# Patient Record
Sex: MALE | Race: WHITE | NOT HISPANIC OR LATINO | URBAN - METROPOLITAN AREA
[De-identification: names, ages, dates, MRNs, and addresses within clinical notes are randomized per-mention and may not be internally consistent; named-entity substitution may affect disease eponyms.]

---

## 2020-10-23 ENCOUNTER — OUTPATIENT (OUTPATIENT)
Dept: OUTPATIENT SERVICES | Facility: HOSPITAL | Age: 70
LOS: 1 days | Discharge: HOME | End: 2020-10-23
Payer: MEDICARE

## 2020-10-23 ENCOUNTER — APPOINTMENT (OUTPATIENT)
Dept: WOUND CARE | Facility: HOSPITAL | Age: 70
End: 2020-10-23
Payer: MEDICARE

## 2020-10-23 VITALS
WEIGHT: 185 LBS | TEMPERATURE: 97.1 F | HEIGHT: 68 IN | DIASTOLIC BLOOD PRESSURE: 91 MMHG | RESPIRATION RATE: 20 BRPM | SYSTOLIC BLOOD PRESSURE: 149 MMHG | HEART RATE: 118 BPM | BODY MASS INDEX: 28.04 KG/M2 | OXYGEN SATURATION: 96 %

## 2020-10-23 DIAGNOSIS — Z82.49 FAMILY HISTORY OF ISCHEMIC HEART DISEASE AND OTHER DISEASES OF THE CIRCULATORY SYSTEM: ICD-10-CM

## 2020-10-23 DIAGNOSIS — Z87.828 PERSONAL HISTORY OF OTHER (HEALED) PHYSICAL INJURY AND TRAUMA: ICD-10-CM

## 2020-10-23 DIAGNOSIS — L97.301 NON-PRESSURE CHRONIC ULCER OF UNSPECIFIED ANKLE LIMITED TO BREAKDOWN OF SKIN: ICD-10-CM

## 2020-10-23 DIAGNOSIS — Z80.0 FAMILY HISTORY OF MALIGNANT NEOPLASM OF DIGESTIVE ORGANS: ICD-10-CM

## 2020-10-23 PROCEDURE — 99203 OFFICE O/P NEW LOW 30 MIN: CPT

## 2020-10-23 PROCEDURE — G0463: CPT

## 2020-10-23 RX ORDER — OXYBUTYNIN CHLORIDE 15 MG/1
15 TABLET, EXTENDED RELEASE ORAL DAILY
Refills: 0 | Status: ACTIVE | COMMUNITY

## 2020-10-23 RX ORDER — OXYCODONE HYDROCHLORIDE 10 MG/1
10 TABLET, FILM COATED, EXTENDED RELEASE ORAL TWICE DAILY
Refills: 0 | Status: ACTIVE | COMMUNITY

## 2020-10-23 RX ORDER — OXYCODONE 5 MG/1
5 TABLET ORAL DAILY
Refills: 0 | Status: ACTIVE | COMMUNITY

## 2020-10-23 NOTE — PLAN
[FreeTextEntry1] : Patient examined and evaluated at this time.\par Continue local wound care and offloading.\par Radiographs ordered.\par Will auth for mri.\par Will auth for vascular studies.\par Pt to follow up in 1 week.

## 2020-10-23 NOTE — PHYSICAL EXAM
[1+] : left 1+ [Ankle Swelling (On Exam)] : not present [Varicose Veins Of Lower Extremities] : not present [] : not present [Skin Ulcer] : ulcer [Calm] : calm [de-identified] : A&Ox3, NAD [de-identified] : 1/5 strength in all quadrants bilaterally [de-identified] : right medial malleolar ankle ulcer down to skin, subcutaneous tissue, fat [FreeTextEntry1] : Right Medial Ankle [FreeTextEntry2] : 0.5 [FreeTextEntry3] : 0.8 [FreeTextEntry4] : 0.4 [de-identified] : Small Serosanguineous [de-identified] : Intact [de-identified] : Silver Alginate/ Allevyn Border [de-identified] : Cleansed with Normal saline\par  [de-identified] : CIRCULATION\par Dorsalis Pedis: R palpable  L palpable\par Posterior Tibialis: R Doppler L Doppler\par Extremity Color: Pigmented\par Extremity Temperature: Warm\par Capillary Refill: < 3 seconds bilaterally\par Vascular studies ordered by Dr Nicolas wood sheet submitted\par \par \par  [de-identified] : None [de-identified] : None [de-identified] : 100% [de-identified] : No

## 2020-10-23 NOTE — HISTORY OF PRESENT ILLNESS
[FreeTextEntry1] : The wound is located on Right Medial Ankle- pt states the wound developed about 2.5 years ago (pressure ulcer)- pt was managed by his Podiatrist (Dr [place]) with different topical creams- the wound has scabbed on & off but never closed- pt states he underwent xrays & MRI in 08/2018 (-OM) but no further diagnostic tests since then

## 2020-10-23 NOTE — REVIEW OF SYSTEMS
[Fever] : no fever [Eye Pain] : no eye pain [Earache] : no earache [Chest Pain] : no chest pain [Shortness Of Breath] : no shortness of breath [Cough] : no cough [Abdominal Pain] : no abdominal pain [Skin Wound] : skin wound [FreeTextEntry9] : paraplegia [de-identified] : right ankle ulcer down to skin, subcutaneous tissue, fat

## 2020-10-23 NOTE — ASSESSMENT
[Verbal] : Verbal [Demo] : Demo [Patient] : Patient [Spouse] : Spouse [Good - alert, interested, motivated] : Good - alert, interested, motivated [Verbalizes knowledge/Understanding] : Verbalizes knowledge/understanding [Dressing changes] : dressing changes [Foot Care] : foot care [Skin Care] : skin care [Pressure relief] : pressure relief [Signs and symptoms of infection] : sign and symptoms of infection [How and When to Call] : how and when to call [Labs and Tests] : labs and tests [Off-loading] : off-loading [Patient responsibility to plan of care] : patient responsibility to plan of care [] : Yes [Stable] : stable [Home] : Home [Wheelchair] : Wheelchair [FreeTextEntry2] : Alteration in skin integrity- promote optimal skin integrity\par  [FreeTextEntry4] : Dr Hercules/ Photos taken\par Vascular studies ordered by - [place] sheet submitted\par Xrays & MRI ordered by Dr Hercules- Pt & Pt's Wife would prefer pt to have xrays & MRI at [facility]-  to bring results of both to next visit to [facility]\par Biopsy ordered by Dr Hercules for next visit to [facility]- [place] sheet submitted\par F/U to [facility] in 1 week

## 2020-10-25 DIAGNOSIS — Z87.81 PERSONAL HISTORY OF (HEALED) TRAUMATIC FRACTURE: ICD-10-CM

## 2020-10-25 DIAGNOSIS — G82.20 PARAPLEGIA, UNSPECIFIED: ICD-10-CM

## 2020-10-25 DIAGNOSIS — Z87.828 PERSONAL HISTORY OF OTHER (HEALED) PHYSICAL INJURY AND TRAUMA: ICD-10-CM

## 2020-10-25 DIAGNOSIS — R26.9 UNSPECIFIED ABNORMALITIES OF GAIT AND MOBILITY: ICD-10-CM

## 2020-10-25 DIAGNOSIS — Z79.899 OTHER LONG TERM (CURRENT) DRUG THERAPY: ICD-10-CM

## 2020-10-25 DIAGNOSIS — L89.513 PRESSURE ULCER OF RIGHT ANKLE, STAGE 3: ICD-10-CM

## 2020-10-25 DIAGNOSIS — Z98.1 ARTHRODESIS STATUS: ICD-10-CM

## 2020-10-25 DIAGNOSIS — Z82.49 FAMILY HISTORY OF ISCHEMIC HEART DISEASE AND OTHER DISEASES OF THE CIRCULATORY SYSTEM: ICD-10-CM

## 2020-10-25 DIAGNOSIS — D50.9 IRON DEFICIENCY ANEMIA, UNSPECIFIED: ICD-10-CM

## 2020-10-25 DIAGNOSIS — Z99.3 DEPENDENCE ON WHEELCHAIR: ICD-10-CM

## 2020-10-25 DIAGNOSIS — R33.9 RETENTION OF URINE, UNSPECIFIED: ICD-10-CM

## 2020-10-25 DIAGNOSIS — Z80.0 FAMILY HISTORY OF MALIGNANT NEOPLASM OF DIGESTIVE ORGANS: ICD-10-CM

## 2020-10-30 ENCOUNTER — OUTPATIENT (OUTPATIENT)
Dept: OUTPATIENT SERVICES | Facility: HOSPITAL | Age: 70
LOS: 1 days | End: 2020-10-30
Payer: COMMERCIAL

## 2020-10-30 ENCOUNTER — APPOINTMENT (OUTPATIENT)
Dept: WOUND CARE | Facility: HOSPITAL | Age: 70
End: 2020-10-30
Payer: MEDICARE

## 2020-10-30 ENCOUNTER — RESULT REVIEW (OUTPATIENT)
Age: 70
End: 2020-10-30

## 2020-10-30 ENCOUNTER — OUTPATIENT (OUTPATIENT)
Dept: OUTPATIENT SERVICES | Facility: HOSPITAL | Age: 70
LOS: 1 days | Discharge: HOME | End: 2020-10-30
Payer: MEDICARE

## 2020-10-30 VITALS
SYSTOLIC BLOOD PRESSURE: 155 MMHG | DIASTOLIC BLOOD PRESSURE: 83 MMHG | BODY MASS INDEX: 28.04 KG/M2 | WEIGHT: 185 LBS | RESPIRATION RATE: 18 BRPM | HEART RATE: 107 BPM | OXYGEN SATURATION: 98 % | TEMPERATURE: 97.6 F | HEIGHT: 68 IN

## 2020-10-30 DIAGNOSIS — L97.301 NON-PRESSURE CHRONIC ULCER OF UNSPECIFIED ANKLE LIMITED TO BREAKDOWN OF SKIN: ICD-10-CM

## 2020-10-30 DIAGNOSIS — L89.513 PRESSURE ULCER OF RIGHT ANKLE, STAGE 3: ICD-10-CM

## 2020-10-30 PROCEDURE — G0463: CPT

## 2020-10-30 PROCEDURE — 99213 OFFICE O/P EST LOW 20 MIN: CPT

## 2020-10-30 PROCEDURE — 93923 UPR/LXTR ART STDY 3+ LVLS: CPT | Mod: 26

## 2020-10-30 PROCEDURE — 93970 EXTREMITY STUDY: CPT

## 2020-10-30 PROCEDURE — 93970 EXTREMITY STUDY: CPT | Mod: 26

## 2020-10-30 PROCEDURE — 93923 UPR/LXTR ART STDY 3+ LVLS: CPT

## 2020-10-31 DIAGNOSIS — Z98.1 ARTHRODESIS STATUS: ICD-10-CM

## 2020-10-31 DIAGNOSIS — Z80.0 FAMILY HISTORY OF MALIGNANT NEOPLASM OF DIGESTIVE ORGANS: ICD-10-CM

## 2020-10-31 DIAGNOSIS — L89.513 PRESSURE ULCER OF RIGHT ANKLE, STAGE 3: ICD-10-CM

## 2020-10-31 DIAGNOSIS — G82.20 PARAPLEGIA, UNSPECIFIED: ICD-10-CM

## 2020-10-31 DIAGNOSIS — Z87.81 PERSONAL HISTORY OF (HEALED) TRAUMATIC FRACTURE: ICD-10-CM

## 2020-10-31 DIAGNOSIS — R26.9 UNSPECIFIED ABNORMALITIES OF GAIT AND MOBILITY: ICD-10-CM

## 2020-10-31 DIAGNOSIS — Z79.899 OTHER LONG TERM (CURRENT) DRUG THERAPY: ICD-10-CM

## 2020-10-31 DIAGNOSIS — D50.9 IRON DEFICIENCY ANEMIA, UNSPECIFIED: ICD-10-CM

## 2020-10-31 DIAGNOSIS — Z99.3 DEPENDENCE ON WHEELCHAIR: ICD-10-CM

## 2020-10-31 DIAGNOSIS — R33.9 RETENTION OF URINE, UNSPECIFIED: ICD-10-CM

## 2020-10-31 DIAGNOSIS — Z87.828 PERSONAL HISTORY OF OTHER (HEALED) PHYSICAL INJURY AND TRAUMA: ICD-10-CM

## 2020-10-31 DIAGNOSIS — Z82.49 FAMILY HISTORY OF ISCHEMIC HEART DISEASE AND OTHER DISEASES OF THE CIRCULATORY SYSTEM: ICD-10-CM

## 2020-11-04 NOTE — REVIEW OF SYSTEMS
[Skin Wound] : skin wound [Fever] : no fever [Eye Pain] : no eye pain [Earache] : no earache [Chest Pain] : no chest pain [Shortness Of Breath] : no shortness of breath [Cough] : no cough [Abdominal Pain] : no abdominal pain [FreeTextEntry9] : paraplegia [de-identified] : right ankle ulcer down to skin, subcutaneous tissue, fat

## 2020-11-04 NOTE — PHYSICAL EXAM
[2 x 2] : 2 x 2  [1+] : left 1+ [Skin Ulcer] : ulcer [Calm] : calm [Ankle Swelling (On Exam)] : not present [Varicose Veins Of Lower Extremities] : not present [] : not present [de-identified] : A&Ox3, NAD [de-identified] : 1/5 strength in all quadrants bilaterally [de-identified] : right medial malleolar ankle ulcer down to skin, subcutaneous tissue, fat [FreeTextEntry1] : Right Medial Ankle [FreeTextEntry2] : 2.6 [FreeTextEntry3] : 4.0 [FreeTextEntry4] : 0.2 [de-identified] : Serosanguineous [de-identified] : Excoriated/ Slightly Macerated [de-identified] : 1-25% [de-identified] : Silver Alginate [de-identified] : Cleansed with Normal Saline\par Cloth Tape [TWNoteComboBox4] : Small [TWNoteComboBox5] : No [de-identified] : No [de-identified] : None [de-identified] : None [de-identified] : >75% [de-identified] : Yes [de-identified] : Daily

## 2020-11-04 NOTE — ASSESSMENT
[Verbal] : Verbal [Patient] : Patient [Good - alert, interested, motivated] : Good - alert, interested, motivated [Verbalizes knowledge/Understanding] : Verbalizes knowledge/understanding [Dressing changes] : dressing changes [Skin Care] : skin care [Signs and symptoms of infection] : sign and symptoms of infection [How and When to Call] : how and when to call [Compression Therapy] : compression therapy [Patient responsibility to plan of care] : patient responsibility to plan of care [Stable] : stable [Home] : Home [Wheelchair] : Wheelchair [Not Applicable - Long Term Care/Home Health Agency] : Long Term Care/Home Health Agency: Not Applicable [] : No [FreeTextEntry2] : Restore Skin Integrity\par Infection Control\par Localized wound care\par Maintain acceptable pain levels at satisfactory relief.\par Demonstrates use of both nonpharmacological and pharmacological pain relief strategies [FreeTextEntry4] : F/U to [facility] in 1 week\par DPM Feels Pt would Benefit From Nushield to Right Medial Ankle, Auth Submitted Pt stated understanding.\par 1 4x4 NuShield ordered on 11/02/20 for pts next assessment

## 2020-11-04 NOTE — HISTORY OF PRESENT ILLNESS
[FreeTextEntry1] : Pt seen forright medial ankle pressure ulcer. Pt and wife relate that they have been dressing the wound as advised. Denies any other complaints at this time. Relates that radiographs and mri was performed.

## 2020-11-04 NOTE — PLAN
[FreeTextEntry1] : Patient examined and evaluated at this time.\par Continue local wound care and offloading.\par Radiographs ordered.\par Will auth for [facility].\par Pt to follow up in 1 week.

## 2020-11-06 ENCOUNTER — APPOINTMENT (OUTPATIENT)
Dept: WOUND CARE | Facility: HOSPITAL | Age: 70
End: 2020-11-06
Payer: MEDICARE

## 2020-11-06 ENCOUNTER — OUTPATIENT (OUTPATIENT)
Dept: OUTPATIENT SERVICES | Facility: HOSPITAL | Age: 70
LOS: 1 days | Discharge: HOME | End: 2020-11-06
Payer: MEDICARE

## 2020-11-06 VITALS
BODY MASS INDEX: 28.04 KG/M2 | RESPIRATION RATE: 18 BRPM | DIASTOLIC BLOOD PRESSURE: 88 MMHG | OXYGEN SATURATION: 20 % | TEMPERATURE: 98.1 F | SYSTOLIC BLOOD PRESSURE: 145 MMHG | HEIGHT: 68 IN | HEART RATE: 108 BPM | WEIGHT: 185 LBS

## 2020-11-06 DIAGNOSIS — Z87.828 PERSONAL HISTORY OF OTHER (HEALED) PHYSICAL INJURY AND TRAUMA: ICD-10-CM

## 2020-11-06 DIAGNOSIS — Z87.81 PERSONAL HISTORY OF (HEALED) TRAUMATIC FRACTURE: ICD-10-CM

## 2020-11-06 DIAGNOSIS — D50.9 IRON DEFICIENCY ANEMIA, UNSPECIFIED: ICD-10-CM

## 2020-11-06 DIAGNOSIS — R26.9 UNSPECIFIED ABNORMALITIES OF GAIT AND MOBILITY: ICD-10-CM

## 2020-11-06 DIAGNOSIS — L97.301 NON-PRESSURE CHRONIC ULCER OF UNSPECIFIED ANKLE LIMITED TO BREAKDOWN OF SKIN: ICD-10-CM

## 2020-11-06 DIAGNOSIS — G82.20 PARAPLEGIA, UNSPECIFIED: ICD-10-CM

## 2020-11-06 DIAGNOSIS — Z80.0 FAMILY HISTORY OF MALIGNANT NEOPLASM OF DIGESTIVE ORGANS: ICD-10-CM

## 2020-11-06 DIAGNOSIS — Z82.49 FAMILY HISTORY OF ISCHEMIC HEART DISEASE AND OTHER DISEASES OF THE CIRCULATORY SYSTEM: ICD-10-CM

## 2020-11-06 DIAGNOSIS — Z98.1 ARTHRODESIS STATUS: ICD-10-CM

## 2020-11-06 DIAGNOSIS — R33.9 RETENTION OF URINE, UNSPECIFIED: ICD-10-CM

## 2020-11-06 DIAGNOSIS — Z79.899 OTHER LONG TERM (CURRENT) DRUG THERAPY: ICD-10-CM

## 2020-11-06 DIAGNOSIS — L89.513 PRESSURE ULCER OF RIGHT ANKLE, STAGE 3: ICD-10-CM

## 2020-11-06 DIAGNOSIS — Z99.3 DEPENDENCE ON WHEELCHAIR: ICD-10-CM

## 2020-11-06 PROCEDURE — 99213 OFFICE O/P EST LOW 20 MIN: CPT

## 2020-11-06 PROCEDURE — G0463: CPT

## 2020-11-06 NOTE — PHYSICAL EXAM
[2 x 2] : 2 x 2  [1+] : left 1+ [Ankle Swelling (On Exam)] : not present [Varicose Veins Of Lower Extremities] : not present [] : not present [Skin Ulcer] : ulcer [Calm] : calm [de-identified] : A&Ox3, NAD [de-identified] : 1/5 strength in all quadrants bilaterally [de-identified] : right medial malleolar ankle ulcer down to skin, subcutaneous tissue, fat [FreeTextEntry1] : Right Medial Ankle [FreeTextEntry2] : 0.5 [FreeTextEntry3] : 0.8 [FreeTextEntry4] : 0.2 [de-identified] : Serosanguineous [de-identified] : 50% [de-identified] : Silver Alginate [de-identified] : Cleansed with Normal Saline\par Cloth Tape [TWNoteComboBox4] : Small [TWNoteComboBox5] : No [de-identified] : No [de-identified] : Normal [de-identified] : None [de-identified] : None [de-identified] : 50% [de-identified] : Yes [de-identified] : Every other day

## 2020-11-06 NOTE — ASSESSMENT
[Verbal] : Verbal [Patient] : Patient [Good - alert, interested, motivated] : Good - alert, interested, motivated [Verbalizes knowledge/Understanding] : Verbalizes knowledge/understanding [Dressing changes] : dressing changes [Skin Care] : skin care [Signs and symptoms of infection] : sign and symptoms of infection [How and When to Call] : how and when to call [Compression Therapy] : compression therapy [Patient responsibility to plan of care] : patient responsibility to plan of care [Stable] : stable [Home] : Home [Wheelchair] : Wheelchair [Not Applicable - Long Term Care/Home Health Agency] : Long Term Care/Home Health Agency: Not Applicable [] : Yes [FreeTextEntry2] : Restore Skin Integrity\par Infection Control\par Localized wound care\par Maintain acceptable pain levels at satisfactory relief.\par Demonstrates use of both nonpharmacological and pharmacological pain relief strategies [FreeTextEntry4] : Photo Taken\par NuShield not applied as per DPM due to small wound size.\par F/U to [facility] in 1 week

## 2020-11-06 NOTE — REVIEW OF SYSTEMS
[Fever] : no fever [Eye Pain] : no eye pain [Earache] : no earache [Chest Pain] : no chest pain [Shortness Of Breath] : no shortness of breath [Cough] : no cough [Abdominal Pain] : no abdominal pain [Skin Wound] : skin wound [FreeTextEntry9] : paraplegia [de-identified] : right ankle ulcer down to skin, subcutaneous tissue, fat

## 2020-11-06 NOTE — REVIEW OF SYSTEMS
[Fever] : no fever [Eye Pain] : no eye pain [Earache] : no earache [Chest Pain] : no chest pain [Shortness Of Breath] : no shortness of breath [Cough] : no cough [Abdominal Pain] : no abdominal pain [Skin Wound] : skin wound [FreeTextEntry9] : paraplegia [de-identified] : right ankle ulcer down to skin, subcutaneous tissue, fat

## 2020-11-06 NOTE — PHYSICAL EXAM
[2 x 2] : 2 x 2  [1+] : left 1+ [Ankle Swelling (On Exam)] : not present [Varicose Veins Of Lower Extremities] : not present [] : not present [Skin Ulcer] : ulcer [Calm] : calm [de-identified] : A&Ox3, NAD [de-identified] : 1/5 strength in all quadrants bilaterally [de-identified] : right medial malleolar ankle ulcer down to skin, subcutaneous tissue, fat [FreeTextEntry1] : Right Medial Ankle [FreeTextEntry2] : 0.5 [FreeTextEntry3] : 0.8 [FreeTextEntry4] : 0.2 [de-identified] : Serosanguineous [de-identified] : 50% [de-identified] : Silver Alginate [de-identified] : Cleansed with Normal Saline\par Cloth Tape [TWNoteComboBox4] : Small [TWNoteComboBox5] : No [de-identified] : No [de-identified] : Normal [de-identified] : None [de-identified] : None [de-identified] : 50% [de-identified] : Yes [de-identified] : Every other day

## 2020-11-06 NOTE — HISTORY OF PRESENT ILLNESS
[FreeTextEntry1] : Pt seen forright medial ankle pressure ulcer. Pt and wife relate that they have been dressing the wound as advised. Denies any other complaints at this time.

## 2020-11-13 ENCOUNTER — OUTPATIENT (OUTPATIENT)
Dept: OUTPATIENT SERVICES | Facility: HOSPITAL | Age: 70
LOS: 1 days | Discharge: HOME | End: 2020-11-13
Payer: MEDICARE

## 2020-11-13 ENCOUNTER — APPOINTMENT (OUTPATIENT)
Dept: WOUND CARE | Facility: HOSPITAL | Age: 70
End: 2020-11-13
Payer: MEDICARE

## 2020-11-13 ENCOUNTER — APPOINTMENT (OUTPATIENT)
Dept: WOUND CARE | Facility: HOSPITAL | Age: 70
End: 2020-11-13

## 2020-11-13 ENCOUNTER — APPOINTMENT (OUTPATIENT)
Dept: SURGERY | Facility: HOSPITAL | Age: 70
End: 2020-11-13

## 2020-11-13 VITALS
OXYGEN SATURATION: 100 % | HEIGHT: 68 IN | RESPIRATION RATE: 20 BRPM | HEART RATE: 120 BPM | DIASTOLIC BLOOD PRESSURE: 84 MMHG | TEMPERATURE: 98.6 F | BODY MASS INDEX: 28.04 KG/M2 | WEIGHT: 185 LBS | SYSTOLIC BLOOD PRESSURE: 146 MMHG

## 2020-11-13 DIAGNOSIS — L97.301 NON-PRESSURE CHRONIC ULCER OF UNSPECIFIED ANKLE LIMITED TO BREAKDOWN OF SKIN: ICD-10-CM

## 2020-11-13 PROCEDURE — 99213 OFFICE O/P EST LOW 20 MIN: CPT

## 2020-11-13 PROCEDURE — G0463: CPT

## 2020-11-14 DIAGNOSIS — R26.9 UNSPECIFIED ABNORMALITIES OF GAIT AND MOBILITY: ICD-10-CM

## 2020-11-14 DIAGNOSIS — Z98.1 ARTHRODESIS STATUS: ICD-10-CM

## 2020-11-14 DIAGNOSIS — D50.9 IRON DEFICIENCY ANEMIA, UNSPECIFIED: ICD-10-CM

## 2020-11-14 DIAGNOSIS — Z79.899 OTHER LONG TERM (CURRENT) DRUG THERAPY: ICD-10-CM

## 2020-11-14 DIAGNOSIS — L89.513 PRESSURE ULCER OF RIGHT ANKLE, STAGE 3: ICD-10-CM

## 2020-11-14 DIAGNOSIS — Z87.828 PERSONAL HISTORY OF OTHER (HEALED) PHYSICAL INJURY AND TRAUMA: ICD-10-CM

## 2020-11-14 DIAGNOSIS — G82.20 PARAPLEGIA, UNSPECIFIED: ICD-10-CM

## 2020-11-14 DIAGNOSIS — Z87.81 PERSONAL HISTORY OF (HEALED) TRAUMATIC FRACTURE: ICD-10-CM

## 2020-11-14 DIAGNOSIS — R33.9 RETENTION OF URINE, UNSPECIFIED: ICD-10-CM

## 2020-11-14 DIAGNOSIS — Z99.3 DEPENDENCE ON WHEELCHAIR: ICD-10-CM

## 2020-11-14 DIAGNOSIS — Z80.0 FAMILY HISTORY OF MALIGNANT NEOPLASM OF DIGESTIVE ORGANS: ICD-10-CM

## 2020-11-14 DIAGNOSIS — Z82.49 FAMILY HISTORY OF ISCHEMIC HEART DISEASE AND OTHER DISEASES OF THE CIRCULATORY SYSTEM: ICD-10-CM

## 2020-11-14 NOTE — PHYSICAL EXAM
[1+] : left 1+ [Ankle Swelling (On Exam)] : not present [Varicose Veins Of Lower Extremities] : not present [] : not present [Skin Ulcer] : ulcer [Calm] : calm [de-identified] : A&Ox3, NAD [de-identified] : 1/5 strength in all quadrants bilaterally [de-identified] : right medial malleolar ankle ulcer down to skin, subcutaneous tissue, fat [FreeTextEntry1] : Right Medial Ankle [FreeTextEntry2] : 0.3 [FreeTextEntry3] : 0.5 [FreeTextEntry4] : 0.1 [de-identified] : Scant Serosanguineous [de-identified] : Intact [de-identified] : Calcium Alginate [de-identified] : Cleansed with Normal Saline\par Cloth Tape [TWNoteComboBox5] : No [de-identified] : No [de-identified] : None [de-identified] : None [de-identified] : 100% [de-identified] : No [de-identified] : Every other day

## 2020-11-14 NOTE — ASSESSMENT
[] : No [FreeTextEntry4] : F/U to [facility] in 1 week [FreeTextEntry2] : Restore Skin Integrity\par Infection Control\par Localized wound care\par Maintain acceptable pain levels at satisfactory relief.\par Demonstrates use of both nonpharmacological and pharmacological pain relief strategies

## 2020-11-14 NOTE — REVIEW OF SYSTEMS
[Fever] : no fever [Eye Pain] : no eye pain [Earache] : no earache [Chest Pain] : no chest pain [Shortness Of Breath] : no shortness of breath [Cough] : no cough [Abdominal Pain] : no abdominal pain [Skin Wound] : skin wound [FreeTextEntry9] : paraplegia [de-identified] : right ankle ulcer down to skin, subcutaneous tissue, fat

## 2020-11-14 NOTE — HISTORY OF PRESENT ILLNESS
[FreeTextEntry1] : Pt seen for right medial ankle pressure ulcer. Pt and wife relate that they have been dressing the wound as advised. Denies any other complaints at this time.

## 2020-11-20 ENCOUNTER — APPOINTMENT (OUTPATIENT)
Dept: SURGERY | Facility: HOSPITAL | Age: 70
End: 2020-11-20
Payer: MEDICARE

## 2020-11-20 ENCOUNTER — APPOINTMENT (OUTPATIENT)
Dept: WOUND CARE | Facility: HOSPITAL | Age: 70
End: 2020-11-20

## 2020-11-20 ENCOUNTER — OUTPATIENT (OUTPATIENT)
Dept: OUTPATIENT SERVICES | Facility: HOSPITAL | Age: 70
LOS: 1 days | Discharge: HOME | End: 2020-11-20
Payer: MEDICARE

## 2020-11-20 VITALS
TEMPERATURE: 97.2 F | BODY MASS INDEX: 28.04 KG/M2 | DIASTOLIC BLOOD PRESSURE: 90 MMHG | WEIGHT: 185 LBS | HEIGHT: 68 IN | SYSTOLIC BLOOD PRESSURE: 139 MMHG | HEART RATE: 114 BPM | OXYGEN SATURATION: 98 % | RESPIRATION RATE: 20 BRPM

## 2020-11-20 DIAGNOSIS — L89.513 PRESSURE ULCER OF RIGHT ANKLE, STAGE 3: ICD-10-CM

## 2020-11-20 DIAGNOSIS — L89.153 PRESSURE ULCER OF SACRAL REGION, STAGE 3: ICD-10-CM

## 2020-11-20 PROCEDURE — G0463: CPT

## 2020-11-20 PROCEDURE — 99213 OFFICE O/P EST LOW 20 MIN: CPT

## 2020-11-20 NOTE — PHYSICAL EXAM
[2 x 2] : 2 x 2  [JVD] : no jugular venous distention  [Normal Breath Sounds] : Normal breath sounds [Normal Heart Sounds] : normal heart sounds [1+] : left 1+ [0] : left 0 [Ankle Swelling (On Exam)] : present [Ankle Swelling Bilaterally] : bilaterally  [Ankle Swelling On The Left] : moderate [Varicose Veins Of Lower Extremities] : bilaterally [Ankle Swelling On The Right] : mild [] : bilaterally [Alert] : alert [Oriented to Person] : oriented to person [Calm] : calm [de-identified] : WD/WN in no acute distress. [de-identified] : WNL [de-identified] : THONGL [de-identified] : WNL [de-identified] : Right ankle pressure ulcer is clean, small, base is red and viable, no drainage, no infection, periwound skin is intact with no cellulitis. [FreeTextEntry1] :  Medial Ankle [FreeTextEntry2] : 0.5 [FreeTextEntry3] : 0.6 [FreeTextEntry4] : 0.1 [de-identified] : Scant Serosanguineous [de-identified] : Intact with dry flaky epithelium  [de-identified] : Calcium Alginate [de-identified] : Cleansed with Normal Saline\par  [TWNoteComboBox1] : Right [TWNoteComboBox5] : No [TWNoteComboBox6] : Pressure [de-identified] : No [de-identified] : Normal [de-identified] : None [de-identified] : None [de-identified] : 100% [de-identified] : No [de-identified] : Every other day [de-identified] : Primary Dressing

## 2020-11-20 NOTE — PLAN
[FreeTextEntry1] : Calcium Alginate, dry dressing, ACE wrap, return to office in two weeks.\par \par

## 2020-11-20 NOTE — ASSESSMENT
[Verbal] : Verbal [Patient] : Patient [Spouse] : Spouse [Good - alert, interested, motivated] : Good - alert, interested, motivated [Verbalizes knowledge/Understanding] : Verbalizes knowledge/understanding [Dressing changes] : dressing changes [Skin Care] : skin care [Signs and symptoms of infection] : sign and symptoms of infection [How and When to Call] : how and when to call [Patient responsibility to plan of care] : patient responsibility to plan of care [Stable] : stable [Home] : Home [Wheelchair] : Wheelchair [Not Applicable - Long Term Care/Home Health Agency] : Long Term Care/Home Health Agency: Not Applicable [] : Yes [FreeTextEntry2] : Restore Skin Integrity\par Infection Control\par Localized wound care\par Maintain acceptable pain levels at satisfactory relief.\par Demonstrates use of both nonpharmacological and pharmacological pain relief strategies\par F/U 2 weeks [FreeTextEntry4] : F/U 2weeks [FreeTextEntry1] : Right ankle pressure ulcer is improving, no acute infection\par

## 2020-11-21 DIAGNOSIS — R33.9 RETENTION OF URINE, UNSPECIFIED: ICD-10-CM

## 2020-11-21 DIAGNOSIS — D50.9 IRON DEFICIENCY ANEMIA, UNSPECIFIED: ICD-10-CM

## 2020-11-21 DIAGNOSIS — G82.20 PARAPLEGIA, UNSPECIFIED: ICD-10-CM

## 2020-11-21 DIAGNOSIS — R26.9 UNSPECIFIED ABNORMALITIES OF GAIT AND MOBILITY: ICD-10-CM

## 2020-11-21 DIAGNOSIS — Z87.828 PERSONAL HISTORY OF OTHER (HEALED) PHYSICAL INJURY AND TRAUMA: ICD-10-CM

## 2020-11-21 DIAGNOSIS — Z79.899 OTHER LONG TERM (CURRENT) DRUG THERAPY: ICD-10-CM

## 2020-11-21 DIAGNOSIS — Z80.0 FAMILY HISTORY OF MALIGNANT NEOPLASM OF DIGESTIVE ORGANS: ICD-10-CM

## 2020-11-21 DIAGNOSIS — Z99.3 DEPENDENCE ON WHEELCHAIR: ICD-10-CM

## 2020-11-21 DIAGNOSIS — L89.513 PRESSURE ULCER OF RIGHT ANKLE, STAGE 3: ICD-10-CM

## 2020-11-21 DIAGNOSIS — Z87.81 PERSONAL HISTORY OF (HEALED) TRAUMATIC FRACTURE: ICD-10-CM

## 2020-11-21 DIAGNOSIS — Z82.49 FAMILY HISTORY OF ISCHEMIC HEART DISEASE AND OTHER DISEASES OF THE CIRCULATORY SYSTEM: ICD-10-CM

## 2020-12-04 ENCOUNTER — OUTPATIENT (OUTPATIENT)
Dept: OUTPATIENT SERVICES | Facility: HOSPITAL | Age: 70
LOS: 1 days | Discharge: HOME | End: 2020-12-04
Payer: MEDICARE

## 2020-12-04 ENCOUNTER — APPOINTMENT (OUTPATIENT)
Dept: SURGERY | Facility: HOSPITAL | Age: 70
End: 2020-12-04
Payer: MEDICARE

## 2020-12-04 VITALS
HEIGHT: 68 IN | BODY MASS INDEX: 28.04 KG/M2 | OXYGEN SATURATION: 97 % | HEART RATE: 98 BPM | RESPIRATION RATE: 20 BRPM | WEIGHT: 185 LBS | TEMPERATURE: 97.5 F | SYSTOLIC BLOOD PRESSURE: 156 MMHG | DIASTOLIC BLOOD PRESSURE: 96 MMHG

## 2020-12-04 DIAGNOSIS — L97.301 NON-PRESSURE CHRONIC ULCER OF UNSPECIFIED ANKLE LIMITED TO BREAKDOWN OF SKIN: ICD-10-CM

## 2020-12-04 PROCEDURE — 99213 OFFICE O/P EST LOW 20 MIN: CPT

## 2020-12-04 PROCEDURE — G0463: CPT

## 2020-12-04 RX ORDER — METOPROLOL TARTRATE 25 MG/1
25 TABLET, FILM COATED ORAL
Refills: 0 | Status: ACTIVE | COMMUNITY

## 2020-12-04 NOTE — ASSESSMENT
[Verbal] : Verbal [Patient] : Patient [Spouse] : Spouse [Good - alert, interested, motivated] : Good - alert, interested, motivated [Verbalizes knowledge/Understanding] : Verbalizes knowledge/understanding [Dressing changes] : dressing changes [Skin Care] : skin care [Signs and symptoms of infection] : sign and symptoms of infection [How and When to Call] : how and when to call [Patient responsibility to plan of care] : patient responsibility to plan of care [] : Yes [Stable] : stable [Home] : Home [Wheelchair] : Wheelchair [Not Applicable - Long Term Care/Home Health Agency] : Long Term Care/Home Health Agency: Not Applicable [FreeTextEntry2] : Restore Skin Integrity\par Infection Control\par Localized wound care\par Maintain acceptable pain levels at satisfactory relief.\par Demonstrates use of both nonpharmacological and pharmacological pain relief strategies\par F/U 2 weeks [FreeTextEntry4] : Pt to F/U to [facility] in 2 Weeks  [FreeTextEntry1] : Right ankle pressure ulcer is stable, no acute infection\par

## 2020-12-04 NOTE — PHYSICAL EXAM
[2 x 2] : 2 x 2  [JVD] : no jugular venous distention  [Normal Breath Sounds] : Normal breath sounds [Normal Heart Sounds] : normal heart sounds [1+] : left 1+ [0] : left 0 [Ankle Swelling (On Exam)] : present [Ankle Swelling Bilaterally] : bilaterally  [Ankle Swelling On The Left] : moderate [Varicose Veins Of Lower Extremities] : bilaterally [Ankle Swelling On The Right] : mild [] : bilaterally [Alert] : alert [Oriented to Person] : oriented to person [Calm] : calm [de-identified] : WD/WN in no acute distress. [de-identified] : WNL [de-identified] : THONGL [de-identified] : WNL [de-identified] : Right ankle pressure ulcer is clean, small, base is red and viable, no drainage, no infection, periwound skin is intact with no cellulitis. [FreeTextEntry1] :  Medial Ankle [FreeTextEntry2] : 0.5 [FreeTextEntry3] : 0.3 [FreeTextEntry4] : 0.1 [de-identified] : Scant Serosanguineous [de-identified] : Intact with dry flaky epithelium  [de-identified] : Silver Alginate  [de-identified] : Cleansed with Normal Saline\par  [TWNoteComboBox1] : Right [TWNoteComboBox5] : No [TWNoteComboBox6] : Pressure [de-identified] : No [de-identified] : Normal [de-identified] : None [de-identified] : None [de-identified] : 100% [de-identified] : No [de-identified] : Every other day [de-identified] : Primary Dressing

## 2020-12-05 DIAGNOSIS — Z80.0 FAMILY HISTORY OF MALIGNANT NEOPLASM OF DIGESTIVE ORGANS: ICD-10-CM

## 2020-12-05 DIAGNOSIS — L89.513 PRESSURE ULCER OF RIGHT ANKLE, STAGE 3: ICD-10-CM

## 2020-12-05 DIAGNOSIS — D50.9 IRON DEFICIENCY ANEMIA, UNSPECIFIED: ICD-10-CM

## 2020-12-05 DIAGNOSIS — Z82.49 FAMILY HISTORY OF ISCHEMIC HEART DISEASE AND OTHER DISEASES OF THE CIRCULATORY SYSTEM: ICD-10-CM

## 2020-12-05 DIAGNOSIS — Z99.3 DEPENDENCE ON WHEELCHAIR: ICD-10-CM

## 2020-12-05 DIAGNOSIS — R33.9 RETENTION OF URINE, UNSPECIFIED: ICD-10-CM

## 2020-12-05 DIAGNOSIS — Z87.81 PERSONAL HISTORY OF (HEALED) TRAUMATIC FRACTURE: ICD-10-CM

## 2020-12-05 DIAGNOSIS — Z79.899 OTHER LONG TERM (CURRENT) DRUG THERAPY: ICD-10-CM

## 2020-12-05 DIAGNOSIS — Z87.828 PERSONAL HISTORY OF OTHER (HEALED) PHYSICAL INJURY AND TRAUMA: ICD-10-CM

## 2020-12-05 DIAGNOSIS — G82.20 PARAPLEGIA, UNSPECIFIED: ICD-10-CM

## 2020-12-05 DIAGNOSIS — R26.9 UNSPECIFIED ABNORMALITIES OF GAIT AND MOBILITY: ICD-10-CM

## 2020-12-05 DIAGNOSIS — Z98.1 ARTHRODESIS STATUS: ICD-10-CM

## 2020-12-16 ENCOUNTER — APPOINTMENT (OUTPATIENT)
Dept: WOUND CARE | Facility: HOSPITAL | Age: 70
End: 2020-12-16
Payer: MEDICARE

## 2020-12-16 ENCOUNTER — OUTPATIENT (OUTPATIENT)
Dept: OUTPATIENT SERVICES | Facility: HOSPITAL | Age: 70
LOS: 1 days | Discharge: HOME | End: 2020-12-16
Payer: MEDICARE

## 2020-12-16 VITALS
HEIGHT: 68 IN | DIASTOLIC BLOOD PRESSURE: 80 MMHG | WEIGHT: 185 LBS | RESPIRATION RATE: 18 BRPM | HEART RATE: 101 BPM | OXYGEN SATURATION: 98 % | BODY MASS INDEX: 28.04 KG/M2 | TEMPERATURE: 96.8 F | SYSTOLIC BLOOD PRESSURE: 138 MMHG

## 2020-12-16 DIAGNOSIS — L97.301 NON-PRESSURE CHRONIC ULCER OF UNSPECIFIED ANKLE LIMITED TO BREAKDOWN OF SKIN: ICD-10-CM

## 2020-12-16 PROCEDURE — 99213 OFFICE O/P EST LOW 20 MIN: CPT

## 2020-12-16 PROCEDURE — G0463: CPT

## 2020-12-16 NOTE — PHYSICAL EXAM
[2 x 2] : 2 x 2  [1+] : left 1+ [Skin Ulcer] : ulcer [Calm] : calm [Ankle Swelling (On Exam)] : not present [Varicose Veins Of Lower Extremities] : not present [] : not present [de-identified] : A&Ox3, NAD [de-identified] : 1/5 strength in all quadrants bilaterally [de-identified] : right medial malleolar ankle ulcer down to skin, subcutaneous tissue, fat [FreeTextEntry1] : Right Medial Ankle [FreeTextEntry2] : 1.1 [FreeTextEntry3] : 1.1 [FreeTextEntry4] : 0.3 [de-identified] : Serosanguineous [de-identified] : Intact [de-identified] : 1-25% [de-identified] : Calcium Alginate [de-identified] : Cleansed with Normal Saline\par  [TWNoteComboBox4] : Small [TWNoteComboBox5] : No [de-identified] : No [de-identified] : None [de-identified] : None [de-identified] : >75% [de-identified] : Yes [de-identified] : Every other day

## 2020-12-16 NOTE — REVIEW OF SYSTEMS
[Skin Wound] : skin wound [Fever] : no fever [Eye Pain] : no eye pain [Earache] : no earache [Chest Pain] : no chest pain [Shortness Of Breath] : no shortness of breath [Cough] : no cough [Abdominal Pain] : no abdominal pain [FreeTextEntry9] : paraplegia [de-identified] : right ankle ulcer down to skin, subcutaneous tissue, fat

## 2020-12-16 NOTE — PHYSICAL EXAM
[2 x 2] : 2 x 2  [1+] : left 1+ [Skin Ulcer] : ulcer [Calm] : calm [Ankle Swelling (On Exam)] : not present [Varicose Veins Of Lower Extremities] : not present [] : not present [de-identified] : A&Ox3, NAD [de-identified] : 1/5 strength in all quadrants bilaterally [de-identified] : right medial malleolar ankle ulcer down to skin, subcutaneous tissue, fat [FreeTextEntry1] : Right Medial Ankle [FreeTextEntry2] : 1.1 [FreeTextEntry3] : 1.1 [FreeTextEntry4] : 0.3 [de-identified] : Serosanguineous [de-identified] : Intact [de-identified] : 1-25% [de-identified] : Calcium Alginate [de-identified] : Cleansed with Normal Saline\par  [TWNoteComboBox4] : Small [TWNoteComboBox5] : No [de-identified] : No [de-identified] : None [de-identified] : None [de-identified] : >75% [de-identified] : Yes [de-identified] : Every other day

## 2020-12-16 NOTE — ASSESSMENT
[Verbal] : Verbal [Patient] : Patient [Good - alert, interested, motivated] : Good - alert, interested, motivated [Verbalizes knowledge/Understanding] : Verbalizes knowledge/understanding [Dressing changes] : dressing changes [Foot Care] : foot care [Skin Care] : skin care [Signs and symptoms of infection] : sign and symptoms of infection [How and When to Call] : how and when to call [Patient responsibility to plan of care] : patient responsibility to plan of care [] : Yes [Stable] : stable [Home] : Home [Wheelchair] : Wheelchair [Not Applicable - Long Term Care/Home Health Agency] : Long Term Care/Home Health Agency: Not Applicable [FreeTextEntry2] : Restore Skin Integrity\par Infection Control\par Localized wound care\par Maintain acceptable pain levels at satisfactory relief.\par Demonstrates use of both nonpharmacological and pharmacological pain relief strategies [FreeTextEntry4] : Photos Taken\par DPM Feels Patient May benefit from NuShield Graft, Auth Submitted\par F/U to [facility] in 1 week

## 2020-12-16 NOTE — PLAN
[FreeTextEntry1] : Patient examined and evaluated at this time.\par Continue local wound care and offloading.\par Will auth for [facility].\par Pt to follow up in 1 week.

## 2020-12-16 NOTE — PLAN
[FreeTextEntry1] : Patient examined and evaluated at this time.\par Continue local wound care and offloading.\par Will auth for [facility].\par Pt to follow up in 1 week.

## 2020-12-16 NOTE — REVIEW OF SYSTEMS
[Skin Wound] : skin wound [Fever] : no fever [Eye Pain] : no eye pain [Earache] : no earache [Chest Pain] : no chest pain [Shortness Of Breath] : no shortness of breath [Cough] : no cough [Abdominal Pain] : no abdominal pain [FreeTextEntry9] : paraplegia [de-identified] : right ankle ulcer down to skin, subcutaneous tissue, fat

## 2020-12-16 NOTE — ASSESSMENT
[Verbal] : Verbal [Patient] : Patient [Good - alert, interested, motivated] : Good - alert, interested, motivated [Verbalizes knowledge/Understanding] : Verbalizes knowledge/understanding [Dressing changes] : dressing changes [Foot Care] : foot care [Skin Care] : skin care [Signs and symptoms of infection] : sign and symptoms of infection [How and When to Call] : how and when to call [Patient responsibility to plan of care] : patient responsibility to plan of care [] : Yes [Stable] : stable [Home] : Home [Wheelchair] : Wheelchair [Not Applicable - Long Term Care/Home Health Agency] : Long Term Care/Home Health Agency: Not Applicable [FreeTextEntry2] : Restore Skin Integrity\par Infection Control\par Localized wound care\par Maintain acceptable pain levels at satisfactory relief.\par Demonstrates use of both nonpharmacological and pharmacological pain relief strategies [FreeTextEntry4] : Photos Taken\par DPM Feels Patient May benefit from NuShield Graft, Auth Submitted\par F/U to [facility] in 1 week

## 2020-12-17 DIAGNOSIS — Z79.899 OTHER LONG TERM (CURRENT) DRUG THERAPY: ICD-10-CM

## 2020-12-17 DIAGNOSIS — R33.9 RETENTION OF URINE, UNSPECIFIED: ICD-10-CM

## 2020-12-17 DIAGNOSIS — R26.9 UNSPECIFIED ABNORMALITIES OF GAIT AND MOBILITY: ICD-10-CM

## 2020-12-17 DIAGNOSIS — L89.513 PRESSURE ULCER OF RIGHT ANKLE, STAGE 3: ICD-10-CM

## 2020-12-17 DIAGNOSIS — G82.20 PARAPLEGIA, UNSPECIFIED: ICD-10-CM

## 2020-12-17 DIAGNOSIS — Z80.0 FAMILY HISTORY OF MALIGNANT NEOPLASM OF DIGESTIVE ORGANS: ICD-10-CM

## 2020-12-17 DIAGNOSIS — Z87.81 PERSONAL HISTORY OF (HEALED) TRAUMATIC FRACTURE: ICD-10-CM

## 2020-12-17 DIAGNOSIS — Z99.3 DEPENDENCE ON WHEELCHAIR: ICD-10-CM

## 2020-12-17 DIAGNOSIS — Z98.1 ARTHRODESIS STATUS: ICD-10-CM

## 2020-12-17 DIAGNOSIS — Z87.828 PERSONAL HISTORY OF OTHER (HEALED) PHYSICAL INJURY AND TRAUMA: ICD-10-CM

## 2020-12-17 DIAGNOSIS — Z82.49 FAMILY HISTORY OF ISCHEMIC HEART DISEASE AND OTHER DISEASES OF THE CIRCULATORY SYSTEM: ICD-10-CM

## 2020-12-17 DIAGNOSIS — D50.9 IRON DEFICIENCY ANEMIA, UNSPECIFIED: ICD-10-CM

## 2020-12-22 ENCOUNTER — APPOINTMENT (OUTPATIENT)
Dept: WOUND CARE | Facility: HOSPITAL | Age: 70
End: 2020-12-22
Payer: MEDICARE

## 2020-12-22 ENCOUNTER — OUTPATIENT (OUTPATIENT)
Dept: OUTPATIENT SERVICES | Facility: HOSPITAL | Age: 70
LOS: 1 days | Discharge: HOME | End: 2020-12-22
Payer: MEDICARE

## 2020-12-22 VITALS
DIASTOLIC BLOOD PRESSURE: 83 MMHG | HEART RATE: 66 BPM | SYSTOLIC BLOOD PRESSURE: 137 MMHG | RESPIRATION RATE: 18 BRPM | BODY MASS INDEX: 28.04 KG/M2 | HEIGHT: 68 IN | TEMPERATURE: 98.1 F | OXYGEN SATURATION: 98 % | WEIGHT: 185 LBS

## 2020-12-22 DIAGNOSIS — L97.301 NON-PRESSURE CHRONIC ULCER OF UNSPECIFIED ANKLE LIMITED TO BREAKDOWN OF SKIN: ICD-10-CM

## 2020-12-22 PROCEDURE — 15271 SKIN SUB GRAFT TRNK/ARM/LEG: CPT

## 2020-12-23 NOTE — PHYSICAL EXAM
[4 x 4] : 4 x 4  [Abdominal Pad] : Abdominal Pad [1+] : left 1+ [Skin Ulcer] : ulcer [Calm] : calm [Ankle Swelling (On Exam)] : not present [Varicose Veins Of Lower Extremities] : not present [] : not present [de-identified] : A&Ox3, NAD [de-identified] : 1/5 strength in all quadrants bilaterally [de-identified] : right medial malleolar ankle ulcer down to skin, subcutaneous tissue, fat [de-identified] : Circ neurovascular function WNL post Ace Wrap\par Nushield 1.6 sq cm Disc\par    [id number]\par     EXP: 04/01/2025\par    ID# [id number]\par    Donor # [id number]\par 100% Used 0% Discarded\par Dressing change by DPM\par  [FreeTextEntry1] : Medial Ankle [FreeTextEntry2] : 1.0 [FreeTextEntry3] : 1.0 [FreeTextEntry4] : 0.3 [de-identified] : serosanguineous  [de-identified] :  NuShield 1.6 sq cm Disc [de-identified] : Adaptic Touch and Calcium Alginate [de-identified] : Cleansed with NS\par  Lot# [id number]\par   EXP: 03/01/2023\par  [TWNoteComboBox1] : Right [TWNoteComboBox4] : Small [TWNoteComboBox5] : No [TWNoteComboBox6] : Pressure [de-identified] : No [de-identified] : Normal [de-identified] : None [de-identified] : None [de-identified] : 100% [de-identified] : No [de-identified] : Application of skin substitute [de-identified] : Ace wraps [de-identified] : Weekly [de-identified] : Primary Dressing

## 2020-12-23 NOTE — PROCEDURE
[Saline] : saline [Other:___] : [unfilled] [Hydrated with saline] : hydrated with saline [Other: ___] : [unfilled] [____ % was used] : and [unfilled] % was used [____ % was discarded] : and [unfilled] % was discarded [FreeTextEntry1] : adaptic, calcium alginate, dry dressing [FreeTextEntry9] : 1820 [de-identified] : right medial malleolar ankle ulcer down to skin, subcutaneous tissue, fat [de-identified] : campbell [de-identified] : kumar [FreeTextEntry6] : right medial malleolar ankle ulcer down to skin, subcutaneous tissue, fat [FreeTextEntry7] : right medial malleolar ankle ulcer down to skin, subcutaneous tissue, fat

## 2020-12-23 NOTE — ASSESSMENT
[Verbal] : Verbal [Demo] : Demo [Patient] : Patient [Good - alert, interested, motivated] : Good - alert, interested, motivated [Verbalizes knowledge/Understanding] : Verbalizes knowledge/understanding [Dressing changes] : dressing changes [Skin Care] : skin care [Pressure relief] : pressure relief [Signs and symptoms of infection] : sign and symptoms of infection [Nutrition] : nutrition [How and When to Call] : how and when to call [Pain Management] : pain management [Off-loading] : off-loading [Patient responsibility to plan of care] : patient responsibility to plan of care [] : Yes [Stable] : stable [Home] : Home [Wheelchair] : Wheelchair [Not Applicable - Long Term Care/Home Health Agency] : Long Term Care/Home Health Agency: Not Applicable [Foot Care] : foot care [Compression Therapy] : compression therapy [FreeTextEntry2] : Infection Prevention\par Promote Skin Integrity\par Offloading\par Elevation\par Compression Compliance\par Pressure Relief\par  [FreeTextEntry4] : F/U 1 week\par Preauthorization for Nushield #2 submitted for next assessment as per DPM Walk in

## 2020-12-23 NOTE — REVIEW OF SYSTEMS
[Skin Wound] : skin wound [FreeTextEntry1] : 1394 [Fever] : no fever [Eye Pain] : no eye pain [Earache] : no earache [Chest Pain] : no chest pain [Shortness Of Breath] : no shortness of breath [Cough] : no cough [Abdominal Pain] : no abdominal pain [FreeTextEntry9] : paraplegia [de-identified] : right ankle ulcer down to skin, subcutaneous tissue, fat

## 2020-12-30 ENCOUNTER — OUTPATIENT (OUTPATIENT)
Dept: OUTPATIENT SERVICES | Facility: HOSPITAL | Age: 70
LOS: 1 days | Discharge: HOME | End: 2020-12-30
Payer: MEDICARE

## 2020-12-30 ENCOUNTER — APPOINTMENT (OUTPATIENT)
Dept: WOUND CARE | Facility: HOSPITAL | Age: 70
End: 2020-12-30
Payer: MEDICARE

## 2020-12-30 VITALS
BODY MASS INDEX: 28.04 KG/M2 | HEIGHT: 68 IN | WEIGHT: 185 LBS | RESPIRATION RATE: 18 BRPM | HEART RATE: 98 BPM | OXYGEN SATURATION: 96 % | DIASTOLIC BLOOD PRESSURE: 90 MMHG | SYSTOLIC BLOOD PRESSURE: 133 MMHG | TEMPERATURE: 97.8 F

## 2020-12-30 DIAGNOSIS — L89.513 PRESSURE ULCER OF RIGHT ANKLE, STAGE 3: ICD-10-CM

## 2020-12-30 DIAGNOSIS — Z80.0 FAMILY HISTORY OF MALIGNANT NEOPLASM OF DIGESTIVE ORGANS: ICD-10-CM

## 2020-12-30 DIAGNOSIS — Z79.899 OTHER LONG TERM (CURRENT) DRUG THERAPY: ICD-10-CM

## 2020-12-30 DIAGNOSIS — Z82.49 FAMILY HISTORY OF ISCHEMIC HEART DISEASE AND OTHER DISEASES OF THE CIRCULATORY SYSTEM: ICD-10-CM

## 2020-12-30 DIAGNOSIS — Z98.1 ARTHRODESIS STATUS: ICD-10-CM

## 2020-12-30 DIAGNOSIS — Z99.3 DEPENDENCE ON WHEELCHAIR: ICD-10-CM

## 2020-12-30 DIAGNOSIS — D50.9 IRON DEFICIENCY ANEMIA, UNSPECIFIED: ICD-10-CM

## 2020-12-30 DIAGNOSIS — R26.9 UNSPECIFIED ABNORMALITIES OF GAIT AND MOBILITY: ICD-10-CM

## 2020-12-30 DIAGNOSIS — R33.9 RETENTION OF URINE, UNSPECIFIED: ICD-10-CM

## 2020-12-30 DIAGNOSIS — G82.20 PARAPLEGIA, UNSPECIFIED: ICD-10-CM

## 2020-12-30 DIAGNOSIS — Z87.81 PERSONAL HISTORY OF (HEALED) TRAUMATIC FRACTURE: ICD-10-CM

## 2020-12-30 DIAGNOSIS — L97.301 NON-PRESSURE CHRONIC ULCER OF UNSPECIFIED ANKLE LIMITED TO BREAKDOWN OF SKIN: ICD-10-CM

## 2020-12-30 DIAGNOSIS — Z87.828 PERSONAL HISTORY OF OTHER (HEALED) PHYSICAL INJURY AND TRAUMA: ICD-10-CM

## 2020-12-30 PROCEDURE — G0463: CPT

## 2020-12-30 PROCEDURE — 99213 OFFICE O/P EST LOW 20 MIN: CPT

## 2020-12-30 NOTE — ASSESSMENT
[Verbal] : Verbal [Demo] : Demo [Patient] : Patient [Good - alert, interested, motivated] : Good - alert, interested, motivated [Verbalizes knowledge/Understanding] : Verbalizes knowledge/understanding [Dressing changes] : dressing changes [Skin Care] : skin care [Pressure relief] : pressure relief [Signs and symptoms of infection] : sign and symptoms of infection [How and When to Call] : how and when to call [Patient responsibility to plan of care] : patient responsibility to plan of care [] : Yes [Stable] : stable [Home] : Home [Wheelchair] : Wheelchair [Not Applicable - Long Term Care/Home Health Agency] : Long Term Care/Home Health Agency: Not Applicable [FreeTextEntry2] : Infection Prevention\par Localized Wound Care\par Offloading / Pressure Relief\par Promote Optimal Skin Integrity\par Maintain acceptable pain level  [FreeTextEntry4] : F/U to [facility] in One Week \par Advised consult with Dr. Murillo

## 2020-12-30 NOTE — PLAN
[FreeTextEntry1] : Patient examined and evaluated at this time.\par Continue local wound care and offloading.\par Referral to Dr. Pack for vascular evaluation.\par Pt to follow up in 1 week.

## 2020-12-30 NOTE — VITALS
[] : No [de-identified] : Pt rates pain 0/10.  Patient denies any pain or discomfort at the present

## 2020-12-30 NOTE — PHYSICAL EXAM
[4 x 4] : 4 x 4  [Abdominal Pad] : Abdominal Pad [1+] : left 1+ [Skin Ulcer] : ulcer [Calm] : calm [Ankle Swelling (On Exam)] : not present [Varicose Veins Of Lower Extremities] : not present [] : not present [de-identified] : A&Ox3, NAD [de-identified] : 1/5 strength in all quadrants bilaterally [de-identified] : right medial malleolar ankle ulcer down to skin, subcutaneous tissue, fat [FreeTextEntry1] : Medial Ankle [FreeTextEntry2] : 1.0 [FreeTextEntry3] : 0.8 [FreeTextEntry4] : 0.3 [de-identified] : Serosanguineous  [de-identified] : 75% [de-identified] : Silver Alginate [de-identified] : Cleansed with Normal Saline \par Kerlix  [TWNoteComboBox1] : Right [TWNoteComboBox4] : Small [TWNoteComboBox5] : No [TWNoteComboBox6] : Pressure [de-identified] : No [de-identified] : Normal [de-identified] : None [de-identified] : None [de-identified] : <20% [de-identified] : Yes [de-identified] : False [de-identified] : False [de-identified] : 3x Weekly [de-identified] : Primary Dressing

## 2020-12-30 NOTE — REVIEW OF SYSTEMS
[Skin Wound] : skin wound [Fever] : no fever [Eye Pain] : no eye pain [Earache] : no earache [Chest Pain] : no chest pain [Shortness Of Breath] : no shortness of breath [Cough] : no cough [Abdominal Pain] : no abdominal pain [FreeTextEntry9] : paraplegia [de-identified] : right ankle ulcer down to skin, subcutaneous tissue, fat

## 2021-01-04 ENCOUNTER — OUTPATIENT (OUTPATIENT)
Dept: OUTPATIENT SERVICES | Facility: HOSPITAL | Age: 71
LOS: 1 days | Discharge: HOME | End: 2021-01-04
Payer: MEDICARE

## 2021-01-04 ENCOUNTER — APPOINTMENT (OUTPATIENT)
Dept: VASCULAR SURGERY | Facility: CLINIC | Age: 71
End: 2021-01-04
Payer: MEDICARE

## 2021-01-04 VITALS
OXYGEN SATURATION: 97 % | BODY MASS INDEX: 28.04 KG/M2 | HEIGHT: 68 IN | RESPIRATION RATE: 18 BRPM | WEIGHT: 185 LBS | SYSTOLIC BLOOD PRESSURE: 130 MMHG | DIASTOLIC BLOOD PRESSURE: 90 MMHG | TEMPERATURE: 97.5 F | HEART RATE: 99 BPM

## 2021-01-04 DIAGNOSIS — Z79.891 LONG TERM (CURRENT) USE OF OPIATE ANALGESIC: ICD-10-CM

## 2021-01-04 DIAGNOSIS — Z98.890 OTHER SPECIFIED POSTPROCEDURAL STATES: ICD-10-CM

## 2021-01-04 DIAGNOSIS — Z80.0 FAMILY HISTORY OF MALIGNANT NEOPLASM OF DIGESTIVE ORGANS: ICD-10-CM

## 2021-01-04 DIAGNOSIS — L89.513 PRESSURE ULCER OF RIGHT ANKLE, STAGE 3: ICD-10-CM

## 2021-01-04 DIAGNOSIS — I83.891 VARICOSE VEINS OF RIGHT LOWER EXTREMITY WITH OTHER COMPLICATIONS: ICD-10-CM

## 2021-01-04 DIAGNOSIS — Z99.3 DEPENDENCE ON WHEELCHAIR: ICD-10-CM

## 2021-01-04 DIAGNOSIS — Z79.899 OTHER LONG TERM (CURRENT) DRUG THERAPY: ICD-10-CM

## 2021-01-04 DIAGNOSIS — Z82.49 FAMILY HISTORY OF ISCHEMIC HEART DISEASE AND OTHER DISEASES OF THE CIRCULATORY SYSTEM: ICD-10-CM

## 2021-01-04 DIAGNOSIS — Z87.828 PERSONAL HISTORY OF OTHER (HEALED) PHYSICAL INJURY AND TRAUMA: ICD-10-CM

## 2021-01-04 DIAGNOSIS — G82.20 PARAPLEGIA, UNSPECIFIED: ICD-10-CM

## 2021-01-04 PROCEDURE — G0463: CPT

## 2021-01-04 PROCEDURE — 99203 OFFICE O/P NEW LOW 30 MIN: CPT

## 2021-01-04 NOTE — PHYSICAL EXAM
[JVD] : no jugular venous distention  [2+] : right 2+ [Ankle Swelling (On Exam)] : present [Ankle Swelling On The Right] : of the right ankle [Varicose Veins Of Lower Extremities] : present [Varicose Veins Of The Right Leg] : of the right leg [Ankle Swelling On The Left] : moderate [] : not present [Skin Ulcer] : ulcer [Alert] : alert [Oriented to Person] : oriented to person [Oriented to Place] : oriented to place [Oriented to Time] : oriented to time [de-identified] : L inner malleolar wound with subcutaneous exposed, hollie wound  hyperpigmentation and dermatitis, cyanotic forefoot [de-identified] : paraplegic

## 2021-01-04 NOTE — HISTORY OF PRESENT ILLNESS
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 He is active in his wheelchair and exercised. He developed a R inner malleolar wound about a year ago. He thought it may have been pressure related. Wound has been improving.

## 2021-01-04 NOTE — ASSESSMENT
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 R inner malleolar venous stasis ulcer. Evidence of L GSV incompetent but somehow US does not document details about R GSV. Location of wound and clinical presentation is very suspicious for venous stasis ulcers. He has used compression therapy.

## 2021-01-06 ENCOUNTER — APPOINTMENT (OUTPATIENT)
Dept: WOUND CARE | Facility: HOSPITAL | Age: 71
End: 2021-01-06
Payer: MEDICARE

## 2021-01-06 ENCOUNTER — OUTPATIENT (OUTPATIENT)
Dept: OUTPATIENT SERVICES | Facility: HOSPITAL | Age: 71
LOS: 1 days | Discharge: HOME | End: 2021-01-06
Payer: MEDICARE

## 2021-01-06 ENCOUNTER — NON-APPOINTMENT (OUTPATIENT)
Age: 71
End: 2021-01-06

## 2021-01-06 VITALS
HEART RATE: 113 BPM | TEMPERATURE: 97.7 F | DIASTOLIC BLOOD PRESSURE: 90 MMHG | OXYGEN SATURATION: 97 % | SYSTOLIC BLOOD PRESSURE: 137 MMHG | RESPIRATION RATE: 18 BRPM

## 2021-01-06 DIAGNOSIS — L97.301 NON-PRESSURE CHRONIC ULCER OF UNSPECIFIED ANKLE LIMITED TO BREAKDOWN OF SKIN: ICD-10-CM

## 2021-01-06 PROCEDURE — 99213 OFFICE O/P EST LOW 20 MIN: CPT

## 2021-01-06 PROCEDURE — 29581 APPL MULTLAYER CMPRN SYS LEG: CPT | Mod: LT

## 2021-01-06 NOTE — PLAN
[FreeTextEntry1] : Patient examined and evaluated at this time.\par Continue local wound care and offloading.\par Pt to obtain venous vascular studies.\par Coban multilayer compression.\par Pt to follow up in 1 week.\par Spent 20 minutes for patient care and medical decision making.\par

## 2021-01-06 NOTE — ASSESSMENT
[Verbal] : Verbal [Written] : Written [Demo] : Demo [Patient] : Patient [Spouse] : Spouse [Good - alert, interested, motivated] : Good - alert, interested, motivated [Verbalizes knowledge/Understanding] : Verbalizes knowledge/understanding [Dressing changes] : dressing changes [Skin Care] : skin care [Signs and symptoms of infection] : sign and symptoms of infection [Venous Disease] : venous disease [How and When to Call] : how and when to call [Labs and Tests] : labs and tests [Pain Management] : pain management [Compression Therapy] : compression therapy [Patient responsibility to plan of care] : patient responsibility to plan of care [Stable] : stable [Home] : Home [Wheelchair] : Wheelchair [Not Applicable - Long Term Care/Home Health Agency] : Long Term Care/Home Health Agency: Not Applicable [] : No [FreeTextEntry2] : Infection prevention\par Localized wound care \par Goal of remaining pain free regarding wounds.\par Compression therapy  [FreeTextEntry3] : Wound the same in status  [FreeTextEntry4] : Pt still to schedule his vascular studies \par Dressing change 1/9/21\par Follow up in 1 week

## 2021-01-06 NOTE — REVIEW OF SYSTEMS
[Fever] : no fever [Eye Pain] : no eye pain [Earache] : no earache [Chest Pain] : no chest pain [Shortness Of Breath] : no shortness of breath [Cough] : no cough [Abdominal Pain] : no abdominal pain [Skin Wound] : skin wound [FreeTextEntry9] : paraplegia [de-identified] : right ankle ulcer down to skin, subcutaneous tissue, fat

## 2021-01-06 NOTE — PHYSICAL EXAM
[2 x 2] : 2 x 2  [1+] : left 1+ [Ankle Swelling (On Exam)] : not present [Varicose Veins Of Lower Extremities] : not present [] : not present [Skin Ulcer] : ulcer [Calm] : calm [de-identified] : A&Ox3, NAD [de-identified] : 1/5 strength in all quadrants bilaterally [de-identified] : right medial malleolar ankle ulcer down to skin, subcutaneous tissue, fat [FreeTextEntry1] : Right medial ankle  [FreeTextEntry2] : 1 [FreeTextEntry3] : 0.8 [FreeTextEntry4] : 0.3 [de-identified] : 5% [de-identified] : Serous/sanguinous [de-identified] : Coban. Expressed comfort post Coban application. [de-identified] : Silver alginate [de-identified] : Cleansed with NS\par Cloth tape \par \par ** Bandage applied by DPM [TWNoteComboBox4] : Small [de-identified] : Normal [de-identified] : None [de-identified] : None [de-identified] : >75% [de-identified] : Yes [de-identified] : Multilayer other compression wrap [de-identified] : 2x Weekly [de-identified] : Primary Dressing

## 2021-01-07 DIAGNOSIS — Z80.0 FAMILY HISTORY OF MALIGNANT NEOPLASM OF DIGESTIVE ORGANS: ICD-10-CM

## 2021-01-07 DIAGNOSIS — Z98.1 ARTHRODESIS STATUS: ICD-10-CM

## 2021-01-07 DIAGNOSIS — R26.9 UNSPECIFIED ABNORMALITIES OF GAIT AND MOBILITY: ICD-10-CM

## 2021-01-07 DIAGNOSIS — G82.20 PARAPLEGIA, UNSPECIFIED: ICD-10-CM

## 2021-01-07 DIAGNOSIS — Z79.899 OTHER LONG TERM (CURRENT) DRUG THERAPY: ICD-10-CM

## 2021-01-07 DIAGNOSIS — D50.9 IRON DEFICIENCY ANEMIA, UNSPECIFIED: ICD-10-CM

## 2021-01-07 DIAGNOSIS — R33.9 RETENTION OF URINE, UNSPECIFIED: ICD-10-CM

## 2021-01-07 DIAGNOSIS — L89.153 PRESSURE ULCER OF SACRAL REGION, STAGE 3: ICD-10-CM

## 2021-01-07 DIAGNOSIS — Z99.3 DEPENDENCE ON WHEELCHAIR: ICD-10-CM

## 2021-01-07 DIAGNOSIS — Z82.49 FAMILY HISTORY OF ISCHEMIC HEART DISEASE AND OTHER DISEASES OF THE CIRCULATORY SYSTEM: ICD-10-CM

## 2021-01-07 DIAGNOSIS — Z79.891 LONG TERM (CURRENT) USE OF OPIATE ANALGESIC: ICD-10-CM

## 2021-01-07 DIAGNOSIS — Z87.828 PERSONAL HISTORY OF OTHER (HEALED) PHYSICAL INJURY AND TRAUMA: ICD-10-CM

## 2021-01-09 ENCOUNTER — OUTPATIENT (OUTPATIENT)
Dept: OUTPATIENT SERVICES | Facility: HOSPITAL | Age: 71
LOS: 1 days | Discharge: HOME | End: 2021-01-09
Payer: MEDICARE

## 2021-01-09 ENCOUNTER — APPOINTMENT (OUTPATIENT)
Dept: PLASTIC SURGERY | Facility: HOSPITAL | Age: 71
End: 2021-01-09
Payer: MEDICARE

## 2021-01-09 VITALS
HEART RATE: 104 BPM | OXYGEN SATURATION: 95 % | SYSTOLIC BLOOD PRESSURE: 152 MMHG | RESPIRATION RATE: 18 BRPM | TEMPERATURE: 98.4 F | DIASTOLIC BLOOD PRESSURE: 83 MMHG

## 2021-01-09 DIAGNOSIS — L97.301 NON-PRESSURE CHRONIC ULCER OF UNSPECIFIED ANKLE LIMITED TO BREAKDOWN OF SKIN: ICD-10-CM

## 2021-01-09 DIAGNOSIS — L89.513 PRESSURE ULCER OF RIGHT ANKLE, STAGE 3: ICD-10-CM

## 2021-01-09 PROCEDURE — 29581 APPL MULTLAYER CMPRN SYS LEG: CPT | Mod: RT

## 2021-01-09 PROCEDURE — ZZZZZ: CPT

## 2021-01-11 ENCOUNTER — OUTPATIENT (OUTPATIENT)
Dept: OUTPATIENT SERVICES | Facility: HOSPITAL | Age: 71
LOS: 1 days | End: 2021-01-11
Payer: MEDICARE

## 2021-01-11 ENCOUNTER — RESULT REVIEW (OUTPATIENT)
Age: 71
End: 2021-01-11

## 2021-01-11 DIAGNOSIS — L89.513 PRESSURE ULCER OF RIGHT ANKLE, STAGE 3: ICD-10-CM

## 2021-01-11 PROCEDURE — 93970 EXTREMITY STUDY: CPT

## 2021-01-11 PROCEDURE — 93970 EXTREMITY STUDY: CPT | Mod: 26

## 2021-01-13 ENCOUNTER — OUTPATIENT (OUTPATIENT)
Dept: OUTPATIENT SERVICES | Facility: HOSPITAL | Age: 71
LOS: 1 days | Discharge: HOME | End: 2021-01-13
Payer: MEDICARE

## 2021-01-13 ENCOUNTER — APPOINTMENT (OUTPATIENT)
Dept: PODIATRY | Facility: HOSPITAL | Age: 71
End: 2021-01-13
Payer: MEDICARE

## 2021-01-13 VITALS
HEART RATE: 95 BPM | RESPIRATION RATE: 18 BRPM | WEIGHT: 185 LBS | TEMPERATURE: 96.8 F | HEIGHT: 68 IN | BODY MASS INDEX: 28.04 KG/M2 | OXYGEN SATURATION: 99 % | DIASTOLIC BLOOD PRESSURE: 95 MMHG | SYSTOLIC BLOOD PRESSURE: 166 MMHG

## 2021-01-13 DIAGNOSIS — L97.301 NON-PRESSURE CHRONIC ULCER OF UNSPECIFIED ANKLE LIMITED TO BREAKDOWN OF SKIN: ICD-10-CM

## 2021-01-13 DIAGNOSIS — L89.513 PRESSURE ULCER OF RIGHT ANKLE, STAGE 3: ICD-10-CM

## 2021-01-13 PROCEDURE — 29581 APPL MULTLAYER CMPRN SYS LEG: CPT | Mod: RT

## 2021-01-13 PROCEDURE — 99213 OFFICE O/P EST LOW 20 MIN: CPT

## 2021-01-14 DIAGNOSIS — L89.512 PRESSURE ULCER OF RIGHT ANKLE, STAGE 2: ICD-10-CM

## 2021-01-14 DIAGNOSIS — Z79.891 LONG TERM (CURRENT) USE OF OPIATE ANALGESIC: ICD-10-CM

## 2021-01-14 DIAGNOSIS — Z80.0 FAMILY HISTORY OF MALIGNANT NEOPLASM OF DIGESTIVE ORGANS: ICD-10-CM

## 2021-01-14 DIAGNOSIS — R33.9 RETENTION OF URINE, UNSPECIFIED: ICD-10-CM

## 2021-01-14 DIAGNOSIS — Z82.49 FAMILY HISTORY OF ISCHEMIC HEART DISEASE AND OTHER DISEASES OF THE CIRCULATORY SYSTEM: ICD-10-CM

## 2021-01-14 DIAGNOSIS — Z87.828 PERSONAL HISTORY OF OTHER (HEALED) PHYSICAL INJURY AND TRAUMA: ICD-10-CM

## 2021-01-14 DIAGNOSIS — Z98.1 ARTHRODESIS STATUS: ICD-10-CM

## 2021-01-14 DIAGNOSIS — R26.9 UNSPECIFIED ABNORMALITIES OF GAIT AND MOBILITY: ICD-10-CM

## 2021-01-14 DIAGNOSIS — G82.20 PARAPLEGIA, UNSPECIFIED: ICD-10-CM

## 2021-01-14 DIAGNOSIS — Z79.899 OTHER LONG TERM (CURRENT) DRUG THERAPY: ICD-10-CM

## 2021-01-14 DIAGNOSIS — D50.9 IRON DEFICIENCY ANEMIA, UNSPECIFIED: ICD-10-CM

## 2021-01-14 DIAGNOSIS — Z87.81 PERSONAL HISTORY OF (HEALED) TRAUMATIC FRACTURE: ICD-10-CM

## 2021-01-14 DIAGNOSIS — Z99.3 DEPENDENCE ON WHEELCHAIR: ICD-10-CM

## 2021-01-14 PROBLEM — L89.513 PRESSURE ULCER OF RIGHT ANKLE, STAGE 3: Status: ACTIVE | Noted: 2020-10-23

## 2021-01-14 NOTE — ASSESSMENT
[Verbal] : Verbal [Written] : Written [Demo] : Demo [Patient] : Patient [Spouse] : Spouse [Good - alert, interested, motivated] : Good - alert, interested, motivated [Verbalizes knowledge/Understanding] : Verbalizes knowledge/understanding [Dressing changes] : dressing changes [Skin Care] : skin care [Signs and symptoms of infection] : sign and symptoms of infection [Venous Disease] : venous disease [How and When to Call] : how and when to call [Labs and Tests] : labs and tests [Compression Therapy] : compression therapy [Patient responsibility to plan of care] : patient responsibility to plan of care [Stable] : stable [Home] : Home [Wheelchair] : Wheelchair [Not Applicable - Long Term Care/Home Health Agency] : Long Term Care/Home Health Agency: Not Applicable [Off-loading] : off-loading [] : No [FreeTextEntry2] : Promote optimal skin integrity, offloading, infection prevention, edema control\par  [FreeTextEntry4] : F/U on Saturday (dressing change) and Monday (Dr. Garcia)\par

## 2021-01-14 NOTE — PHYSICAL EXAM
[2 x 2] : 2 x 2  [1+] : left 1+ [Skin Ulcer] : ulcer [Calm] : calm [Ankle Swelling (On Exam)] : not present [Varicose Veins Of Lower Extremities] : not present [] : not present [de-identified] : A&Ox3, NAD [de-identified] : HTN [de-identified] : 1/5 strength in all quadrants bilaterally [de-identified] : right medial malleolar ankle ulcer down to skin, subcutaneous tissue, fat [FreeTextEntry2] : 0.8 [FreeTextEntry1] : Right medial ankle  [FreeTextEntry3] : 0.8 [FreeTextEntry4] : 0.2 [de-identified] : serosanguineous [de-identified] : intact [de-identified] : 20% [de-identified] : 80% [de-identified] : Coban [de-identified] : Silver alginate [TWNoteComboBox4] : Small [de-identified] : NSC\par ** Bandage applied by DPM [de-identified] : Normal [de-identified] : None [de-identified] : None [de-identified] : >75% [de-identified] : Yes [de-identified] : Multilayer other compression wrap [de-identified] : 2x Weekly [de-identified] : Primary Dressing

## 2021-01-14 NOTE — REVIEW OF SYSTEMS
[Skin Wound] : skin wound [Limb Weakness] : limb weakness [Negative] : Endocrine [Fever] : no fever [Eye Pain] : no eye pain [Earache] : no earache [Chest Pain] : no chest pain [Shortness Of Breath] : no shortness of breath [Cough] : no cough [Abdominal Pain] : no abdominal pain [Anxiety] : no anxiety [Easy Bleeding] : no tendency for easy bleeding [FreeTextEntry5] : HTN [FreeTextEntry9] : paraplegia [de-identified] : right ankle ulcer down to skin, subcutaneous tissue, fat [de-identified] : insensate

## 2021-01-14 NOTE — PLAN
[FreeTextEntry1] : continue off loading and local wound care . Spent 25 minutes for patient care and medical decision making.\par

## 2021-01-14 NOTE — VITALS
[] : No [de-identified] : Pain scale:  0/10 - Patient reports no c/o pains or discomforts at present.

## 2021-01-16 ENCOUNTER — APPOINTMENT (OUTPATIENT)
Dept: WOUND CARE | Facility: HOSPITAL | Age: 71
End: 2021-01-16
Payer: MEDICARE

## 2021-01-16 ENCOUNTER — OUTPATIENT (OUTPATIENT)
Dept: OUTPATIENT SERVICES | Facility: HOSPITAL | Age: 71
LOS: 1 days | Discharge: HOME | End: 2021-01-16
Payer: MEDICARE

## 2021-01-16 VITALS
HEART RATE: 86 BPM | DIASTOLIC BLOOD PRESSURE: 84 MMHG | WEIGHT: 185 LBS | TEMPERATURE: 97.1 F | BODY MASS INDEX: 28.04 KG/M2 | HEIGHT: 68 IN | RESPIRATION RATE: 20 BRPM | SYSTOLIC BLOOD PRESSURE: 160 MMHG

## 2021-01-16 DIAGNOSIS — L97.301 NON-PRESSURE CHRONIC ULCER OF UNSPECIFIED ANKLE LIMITED TO BREAKDOWN OF SKIN: ICD-10-CM

## 2021-01-16 DIAGNOSIS — L89.512 PRESSURE ULCER OF RIGHT ANKLE, STAGE 2: ICD-10-CM

## 2021-01-16 PROCEDURE — ZZZZZ: CPT

## 2021-01-16 PROCEDURE — 29581 APPL MULTLAYER CMPRN SYS LEG: CPT | Mod: RT

## 2021-01-18 ENCOUNTER — APPOINTMENT (OUTPATIENT)
Dept: VASCULAR SURGERY | Facility: CLINIC | Age: 71
End: 2021-01-18
Payer: MEDICARE

## 2021-01-18 ENCOUNTER — OUTPATIENT (OUTPATIENT)
Dept: OUTPATIENT SERVICES | Facility: HOSPITAL | Age: 71
LOS: 1 days | Discharge: HOME | End: 2021-01-18
Payer: MEDICARE

## 2021-01-18 VITALS
SYSTOLIC BLOOD PRESSURE: 136 MMHG | DIASTOLIC BLOOD PRESSURE: 86 MMHG | BODY MASS INDEX: 28.04 KG/M2 | WEIGHT: 185 LBS | HEART RATE: 110 BPM | RESPIRATION RATE: 18 BRPM | OXYGEN SATURATION: 98 % | TEMPERATURE: 97.3 F | HEIGHT: 68 IN

## 2021-01-18 DIAGNOSIS — I83.218 VARICOSE VEINS OF RIGHT LOWER EXTREMITY WITH BOTH ULCER OF OTHER PART OF LOWER EXTREMITY AND INFLAMMATION: ICD-10-CM

## 2021-01-18 DIAGNOSIS — Z87.828 PERSONAL HISTORY OF OTHER (HEALED) PHYSICAL INJURY AND TRAUMA: ICD-10-CM

## 2021-01-18 DIAGNOSIS — Z99.3 DEPENDENCE ON WHEELCHAIR: ICD-10-CM

## 2021-01-18 DIAGNOSIS — Z80.0 FAMILY HISTORY OF MALIGNANT NEOPLASM OF DIGESTIVE ORGANS: ICD-10-CM

## 2021-01-18 DIAGNOSIS — I87.2 VENOUS INSUFFICIENCY (CHRONIC) (PERIPHERAL): ICD-10-CM

## 2021-01-18 DIAGNOSIS — Z82.49 FAMILY HISTORY OF ISCHEMIC HEART DISEASE AND OTHER DISEASES OF THE CIRCULATORY SYSTEM: ICD-10-CM

## 2021-01-18 DIAGNOSIS — L97.909 VARICOSE VEINS OF UNSPECIFIED LOWER EXTREMITY WITH ULCER OF UNSPECIFIED SITE: ICD-10-CM

## 2021-01-18 DIAGNOSIS — Z79.899 OTHER LONG TERM (CURRENT) DRUG THERAPY: ICD-10-CM

## 2021-01-18 DIAGNOSIS — L89.152 PRESSURE ULCER OF SACRAL REGION, STAGE 2: ICD-10-CM

## 2021-01-18 DIAGNOSIS — G82.20 PARAPLEGIA, UNSPECIFIED: ICD-10-CM

## 2021-01-18 DIAGNOSIS — Z79.891 LONG TERM (CURRENT) USE OF OPIATE ANALGESIC: ICD-10-CM

## 2021-01-18 DIAGNOSIS — I83.891 VARICOSE VEINS OF RIGHT LOWER EXTREMITY WITH OTHER COMPLICATIONS: ICD-10-CM

## 2021-01-18 DIAGNOSIS — I83.009 VARICOSE VEINS OF UNSPECIFIED LOWER EXTREMITY WITH ULCER OF UNSPECIFIED SITE: ICD-10-CM

## 2021-01-18 DIAGNOSIS — Z98.890 OTHER SPECIFIED POSTPROCEDURAL STATES: ICD-10-CM

## 2021-01-18 PROCEDURE — 99213 OFFICE O/P EST LOW 20 MIN: CPT

## 2021-01-18 PROCEDURE — 29581 APPL MULTLAYER CMPRN SYS LEG: CPT | Mod: LT

## 2021-01-18 RX ORDER — SODIUM FLUORIDE 0.9 MG/ML
0.2 MOUTHWASH DENTAL
Qty: 473 | Refills: 0 | Status: ACTIVE | COMMUNITY
Start: 2020-08-12

## 2021-01-18 RX ORDER — BACLOFEN 20 MG/1
20 TABLET ORAL
Qty: 60 | Refills: 0 | Status: ACTIVE | COMMUNITY
Start: 2020-10-09

## 2021-01-18 NOTE — PHYSICAL EXAM
[JVD] : no jugular venous distention  [2+] : left 2+ [Ankle Swelling (On Exam)] : present [Ankle Swelling On The Right] : of the right ankle [Varicose Veins Of Lower Extremities] : present [Ankle Swelling On The Left] : moderate [Varicose Veins Of The Right Leg] : of the right leg [] : not present [Skin Ulcer] : ulcer [Alert] : alert [Oriented to Place] : oriented to place [Oriented to Person] : oriented to person [Oriented to Time] : oriented to time [de-identified] : L inner malleolar wound is epithelialized with 1/3 of wound still open with pink base. No evidence of varicose veins.  [de-identified] : paraplegic

## 2021-01-18 NOTE — HISTORY OF PRESENT ILLNESS
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 He is active in his wheelchair and exercised. He developed a R inner malleolar wound about a year ago. He thought it may have been pressure related. Wound has been improving.  [de-identified] : Patient has been doing well coming to .  Compression therapy has been helping wound heal and is now almost healed. He is here to review RLE venous US that was re ordered to be performed in standing position.

## 2021-01-18 NOTE — ASSESSMENT
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 R inner malleolar venous stasis ulcer. Evidence of L GSV incompetent but somehow US does not document details about R GSV. Location of wound and clinical presentation is very suspicious for venous stasis ulcers. He has used compression therapy. \par \par Repeated US shows R GSV incomp at the level of the knee. This is likely to be related to wound development.

## 2021-01-18 NOTE — REASON FOR VISIT
[Follow-Up: _____] : a [unfilled] follow-up visit [Consultation] : a consultation visit [FreeTextEntry1] : R inner malleolar wound

## 2021-01-20 ENCOUNTER — APPOINTMENT (OUTPATIENT)
Dept: WOUND CARE | Facility: HOSPITAL | Age: 71
End: 2021-01-20
Payer: MEDICARE

## 2021-01-20 ENCOUNTER — OUTPATIENT (OUTPATIENT)
Dept: OUTPATIENT SERVICES | Facility: HOSPITAL | Age: 71
LOS: 1 days | Discharge: HOME | End: 2021-01-20
Payer: MEDICARE

## 2021-01-20 VITALS
HEIGHT: 68 IN | SYSTOLIC BLOOD PRESSURE: 158 MMHG | HEART RATE: 110 BPM | RESPIRATION RATE: 20 BRPM | WEIGHT: 185 LBS | TEMPERATURE: 97.6 F | DIASTOLIC BLOOD PRESSURE: 89 MMHG | OXYGEN SATURATION: 98 % | BODY MASS INDEX: 28.04 KG/M2

## 2021-01-20 DIAGNOSIS — L97.301 NON-PRESSURE CHRONIC ULCER OF UNSPECIFIED ANKLE LIMITED TO BREAKDOWN OF SKIN: ICD-10-CM

## 2021-01-20 PROCEDURE — 29581 APPL MULTLAYER CMPRN SYS LEG: CPT | Mod: RT

## 2021-01-20 PROCEDURE — 99213 OFFICE O/P EST LOW 20 MIN: CPT

## 2021-01-20 NOTE — REVIEW OF SYSTEMS
[Skin Wound] : skin wound [Fever] : no fever [Eye Pain] : no eye pain [Earache] : no earache [Chest Pain] : no chest pain [Shortness Of Breath] : no shortness of breath [Cough] : no cough [Abdominal Pain] : no abdominal pain [FreeTextEntry9] : paraplegia [de-identified] : right ankle ulcer down to skin, subcutaneous tissue, fat

## 2021-01-20 NOTE — HISTORY OF PRESENT ILLNESS
[FreeTextEntry1] : Pt seen for right medial ankle pressure ulcer. Pt relates that the dressings have been changed as advised. Denies any other complaints at this time.

## 2021-01-20 NOTE — PHYSICAL EXAM
[1+] : left 1+ [Ankle Swelling (On Exam)] : present [Ankle Swelling On The Right] : of the right ankle [Varicose Veins Of Lower Extremities] : present [Varicose Veins Of The Right Leg] : of the right leg [Ankle Swelling On The Left] : moderate [Skin Ulcer] : ulcer [Calm] : calm [4 x 4] : 4 x 4  [] : not present [de-identified] : A&Ox3, NAD [de-identified] : 1/5 strength in all quadrants bilaterally [de-identified] : right medial malleolar ankle ulcer down to skin, subcutaneous tissue, fat [FreeTextEntry1] : Right medial ankle - Scab  [de-identified] : Scant Serous/sanguinous [de-identified] : Coban. Expressed comfort post Coban application. [de-identified] : Alginate  [de-identified] : Cleansed with NS\par Kerlix \par \par *** Bandage applied by TANKM [de-identified] : Normal [de-identified] : None [de-identified] : None [de-identified] : No [de-identified] : Multilayer other compression wrap [de-identified] : Weekly [de-identified] : Primary Dressing

## 2021-01-20 NOTE — PLAN
[FreeTextEntry1] : Patient examined and evaluated at this time.\par Continue local wound care and offloading.\par Pt to obtain venous vascular studies.\par Coban multilayer compression.\par Pt to follow up in 1 week.\par Referral to orthotist for circaids.\par Spent 20 minutes for patient care and medical decision making.\par

## 2021-01-20 NOTE — ASSESSMENT
[Verbal] : Verbal [Written] : Written [Demo] : Demo [Patient] : Patient [Good - alert, interested, motivated] : Good - alert, interested, motivated [Verbalizes knowledge/Understanding] : Verbalizes knowledge/understanding [Dressing changes] : dressing changes [Skin Care] : skin care [Signs and symptoms of infection] : sign and symptoms of infection [Venous Disease] : venous disease [How and When to Call] : how and when to call [Pain Management] : pain management [Compression Therapy] : compression therapy [Patient responsibility to plan of care] : patient responsibility to plan of care [Stable] : stable [Home] : Home [Wheelchair] : Wheelchair [Not Applicable - Long Term Care/Home Health Agency] : Long Term Care/Home Health Agency: Not Applicable [] : No [FreeTextEntry2] : Infection prevention\par Localized wound care \par Goal of remaining pain free regarding wounds.\par Compression therapy  [FreeTextEntry4] : Orthotist consult submitted for patient to be fitted for CircAid.\par Follow up in 1 week

## 2021-01-21 ENCOUNTER — NON-APPOINTMENT (OUTPATIENT)
Age: 71
End: 2021-01-21

## 2021-01-21 DIAGNOSIS — R26.9 UNSPECIFIED ABNORMALITIES OF GAIT AND MOBILITY: ICD-10-CM

## 2021-01-21 DIAGNOSIS — Z98.1 ARTHRODESIS STATUS: ICD-10-CM

## 2021-01-21 DIAGNOSIS — Z99.3 DEPENDENCE ON WHEELCHAIR: ICD-10-CM

## 2021-01-21 DIAGNOSIS — Z79.891 LONG TERM (CURRENT) USE OF OPIATE ANALGESIC: ICD-10-CM

## 2021-01-21 DIAGNOSIS — Z82.49 FAMILY HISTORY OF ISCHEMIC HEART DISEASE AND OTHER DISEASES OF THE CIRCULATORY SYSTEM: ICD-10-CM

## 2021-01-21 DIAGNOSIS — D50.9 IRON DEFICIENCY ANEMIA, UNSPECIFIED: ICD-10-CM

## 2021-01-21 DIAGNOSIS — Z80.0 FAMILY HISTORY OF MALIGNANT NEOPLASM OF DIGESTIVE ORGANS: ICD-10-CM

## 2021-01-21 DIAGNOSIS — R33.9 RETENTION OF URINE, UNSPECIFIED: ICD-10-CM

## 2021-01-21 DIAGNOSIS — L89.512 PRESSURE ULCER OF RIGHT ANKLE, STAGE 2: ICD-10-CM

## 2021-01-21 DIAGNOSIS — Z87.828 PERSONAL HISTORY OF OTHER (HEALED) PHYSICAL INJURY AND TRAUMA: ICD-10-CM

## 2021-01-21 DIAGNOSIS — Z87.81 PERSONAL HISTORY OF (HEALED) TRAUMATIC FRACTURE: ICD-10-CM

## 2021-01-21 DIAGNOSIS — I83.891 VARICOSE VEINS OF RIGHT LOWER EXTREMITY WITH OTHER COMPLICATIONS: ICD-10-CM

## 2021-01-21 DIAGNOSIS — G82.20 PARAPLEGIA, UNSPECIFIED: ICD-10-CM

## 2021-01-21 DIAGNOSIS — Z79.899 OTHER LONG TERM (CURRENT) DRUG THERAPY: ICD-10-CM

## 2021-01-27 ENCOUNTER — APPOINTMENT (OUTPATIENT)
Dept: WOUND CARE | Facility: HOSPITAL | Age: 71
End: 2021-01-27
Payer: MEDICARE

## 2021-01-27 ENCOUNTER — OUTPATIENT (OUTPATIENT)
Dept: OUTPATIENT SERVICES | Facility: HOSPITAL | Age: 71
LOS: 1 days | Discharge: HOME | End: 2021-01-27
Payer: MEDICARE

## 2021-01-27 VITALS
DIASTOLIC BLOOD PRESSURE: 82 MMHG | HEART RATE: 105 BPM | RESPIRATION RATE: 20 BRPM | SYSTOLIC BLOOD PRESSURE: 139 MMHG | HEIGHT: 68 IN | BODY MASS INDEX: 28.04 KG/M2 | TEMPERATURE: 97.1 F | OXYGEN SATURATION: 96 % | WEIGHT: 185 LBS

## 2021-01-27 DIAGNOSIS — Z82.49 FAMILY HISTORY OF ISCHEMIC HEART DISEASE AND OTHER DISEASES OF THE CIRCULATORY SYSTEM: ICD-10-CM

## 2021-01-27 DIAGNOSIS — Z79.891 LONG TERM (CURRENT) USE OF OPIATE ANALGESIC: ICD-10-CM

## 2021-01-27 DIAGNOSIS — Z99.3 DEPENDENCE ON WHEELCHAIR: ICD-10-CM

## 2021-01-27 DIAGNOSIS — Z87.81 PERSONAL HISTORY OF (HEALED) TRAUMATIC FRACTURE: ICD-10-CM

## 2021-01-27 DIAGNOSIS — R33.9 RETENTION OF URINE, UNSPECIFIED: ICD-10-CM

## 2021-01-27 DIAGNOSIS — I83.891 VARICOSE VEINS OF RIGHT LOWER EXTREMITY WITH OTHER COMPLICATIONS: ICD-10-CM

## 2021-01-27 DIAGNOSIS — Z79.899 OTHER LONG TERM (CURRENT) DRUG THERAPY: ICD-10-CM

## 2021-01-27 DIAGNOSIS — G82.20 PARAPLEGIA, UNSPECIFIED: ICD-10-CM

## 2021-01-27 DIAGNOSIS — Z80.0 FAMILY HISTORY OF MALIGNANT NEOPLASM OF DIGESTIVE ORGANS: ICD-10-CM

## 2021-01-27 DIAGNOSIS — L97.301 NON-PRESSURE CHRONIC ULCER OF UNSPECIFIED ANKLE LIMITED TO BREAKDOWN OF SKIN: ICD-10-CM

## 2021-01-27 DIAGNOSIS — Z87.828 PERSONAL HISTORY OF OTHER (HEALED) PHYSICAL INJURY AND TRAUMA: ICD-10-CM

## 2021-01-27 DIAGNOSIS — D50.9 IRON DEFICIENCY ANEMIA, UNSPECIFIED: ICD-10-CM

## 2021-01-27 DIAGNOSIS — Z98.1 ARTHRODESIS STATUS: ICD-10-CM

## 2021-01-27 DIAGNOSIS — L89.512 PRESSURE ULCER OF RIGHT ANKLE, STAGE 2: ICD-10-CM

## 2021-01-27 DIAGNOSIS — R26.9 UNSPECIFIED ABNORMALITIES OF GAIT AND MOBILITY: ICD-10-CM

## 2021-01-27 PROCEDURE — 99213 OFFICE O/P EST LOW 20 MIN: CPT

## 2021-01-27 PROCEDURE — 29581 APPL MULTLAYER CMPRN SYS LEG: CPT | Mod: RT

## 2021-01-27 NOTE — PHYSICAL EXAM
[1+] : left 1+ [Ankle Swelling (On Exam)] : present [Ankle Swelling On The Right] : of the right ankle [Varicose Veins Of Lower Extremities] : present [Varicose Veins Of The Right Leg] : of the right leg [Ankle Swelling On The Left] : moderate [] : not present [Skin Ulcer] : ulcer [Calm] : calm [de-identified] : A&Ox3, NAD [de-identified] : 1/5 strength in all quadrants bilaterally [de-identified] : right medial malleolar ankle ulcer down to skin, subcutaneous tissue, fat, epithelialized [FreeTextEntry1] : Right Medial Ankle- Scab [de-identified] : Intact [de-identified] : No Treatment [de-identified] : Cleansed with Normal Saline\par Allevyn Border [TWNoteComboBox4] : None [TWNoteComboBox5] : No [de-identified] : No [de-identified] : None [de-identified] : 100% [de-identified] : None [de-identified] : No [de-identified] : Ace wraps

## 2021-01-27 NOTE — ASSESSMENT
[Demo] : Demo [Verbal] : Verbal [Good - alert, interested, motivated] : Good - alert, interested, motivated [Patient] : Patient [Verbalizes knowledge/Understanding] : Verbalizes knowledge/understanding [Dressing changes] : dressing changes [Foot Care] : foot care [Skin Care] : skin care [Signs and symptoms of infection] : sign and symptoms of infection [How and When to Call] : how and when to call [Compression Therapy] : compression therapy [Patient responsibility to plan of care] : patient responsibility to plan of care [Stable] : stable [Home] : Home [Wheelchair] : Wheelchair [Not Applicable - Long Term Care/Home Health Agency] : Long Term Care/Home Health Agency: Not Applicable [] : No [FreeTextEntry2] : Maintain optimal skin integrity\par  [FreeTextEntry4] : F/U to [facility] in 1 week\par Seen by Des GOMEZ for Circaid Wraps today.

## 2021-01-27 NOTE — REVIEW OF SYSTEMS
[Fever] : no fever [Eye Pain] : no eye pain [Earache] : no earache [Chest Pain] : no chest pain [Shortness Of Breath] : no shortness of breath [Cough] : no cough [Abdominal Pain] : no abdominal pain [Skin Wound] : skin wound [FreeTextEntry9] : paraplegia [de-identified] : right ankle ulcer down to skin, subcutaneous tissue, fat, epithelialized

## 2021-01-27 NOTE — PLAN
[FreeTextEntry1] : Patient examined and evaluated at this time.\par Continue local wound care and offloading.\par Pt to follow up in 1 week.\par Referral to orthotist for circaids.\par Spent 20 minutes for patient care and medical decision making.\par

## 2021-02-03 ENCOUNTER — OUTPATIENT (OUTPATIENT)
Dept: OUTPATIENT SERVICES | Facility: HOSPITAL | Age: 71
LOS: 1 days | Discharge: HOME | End: 2021-02-03
Payer: MEDICARE

## 2021-02-03 ENCOUNTER — APPOINTMENT (OUTPATIENT)
Dept: WOUND CARE | Facility: HOSPITAL | Age: 71
End: 2021-02-03
Payer: MEDICARE

## 2021-02-03 VITALS
BODY MASS INDEX: 28.04 KG/M2 | SYSTOLIC BLOOD PRESSURE: 129 MMHG | TEMPERATURE: 97.6 F | HEART RATE: 88 BPM | HEIGHT: 68 IN | OXYGEN SATURATION: 98 % | RESPIRATION RATE: 21 BRPM | DIASTOLIC BLOOD PRESSURE: 80 MMHG | WEIGHT: 185 LBS

## 2021-02-03 DIAGNOSIS — L97.301 NON-PRESSURE CHRONIC ULCER OF UNSPECIFIED ANKLE LIMITED TO BREAKDOWN OF SKIN: ICD-10-CM

## 2021-02-03 PROCEDURE — 29581 APPL MULTLAYER CMPRN SYS LEG: CPT | Mod: RT

## 2021-02-03 PROCEDURE — 99213 OFFICE O/P EST LOW 20 MIN: CPT

## 2021-02-03 NOTE — HISTORY OF PRESENT ILLNESS
[FreeTextEntry1] : Pt seen for right medial ankle pressure ulcer, healed. Pt relates that the dressings have been changed as advised. Denies any other complaints at this time.

## 2021-02-03 NOTE — PLAN
[FreeTextEntry1] : Patient examined and evaluated at this time.\par Continue local wound care and offloading.\par Pt to follow up in 3 weeks.\par Pt to use circaid compression.\par Spent 20 minutes for patient care and medical decision making.\par

## 2021-02-03 NOTE — REVIEW OF SYSTEMS
[Fever] : no fever [Eye Pain] : no eye pain [Earache] : no earache [Chest Pain] : no chest pain [Shortness Of Breath] : no shortness of breath [Cough] : no cough [Abdominal Pain] : no abdominal pain [Skin Wound] : skin wound [FreeTextEntry9] : paraplegia [de-identified] : right ankle ulcer down to skin, subcutaneous tissue, fat, epithelialized

## 2021-02-03 NOTE — ASSESSMENT
[Verbal] : Verbal [Demo] : Demo [Patient] : Patient [Good - alert, interested, motivated] : Good - alert, interested, motivated [Verbalizes knowledge/Understanding] : Verbalizes knowledge/understanding [Dressing changes] : dressing changes [Foot Care] : foot care [Skin Care] : skin care [Signs and symptoms of infection] : sign and symptoms of infection [Venous Disease] : venous disease [Nutrition] : nutrition [How and When to Call] : how and when to call [Compression Therapy] : compression therapy [Home Health] : home health [Patient responsibility to plan of care] : patient responsibility to plan of care [] : Yes [Stable] : stable [Home] : Home [Wheelchair] : Wheelchair [Not Applicable - Long Term Care/Home Health Agency] : Long Term Care/Home Health Agency: Not Applicable [Pressure relief] : pressure relief [Off-loading] : off-loading [FreeTextEntry2] : Infection Prevention\par Promote Skin Integrity\par Offloading\par Elevation\par Compression Compliance\par Low Na+ Diet\par Maintain acceptable levels of pain\par Demonstrates use of both pharmacological and nonpharmacological pain management interventions\par Pressure relief\par  [FreeTextEntry4] : Pt fitted for circaid compression today\par F/U 3 weeks for assessment

## 2021-02-03 NOTE — PHYSICAL EXAM
[2 x 2] : 2 x 2  [1+] : left 1+ [Ankle Swelling (On Exam)] : present [Ankle Swelling On The Right] : of the right ankle [Varicose Veins Of Lower Extremities] : present [Varicose Veins Of The Right Leg] : of the right leg [Ankle Swelling On The Left] : moderate [] : not present [Skin Ulcer] : ulcer [Calm] : calm [de-identified] : A&Ox3, NAD [de-identified] : 1/5 strength in all quadrants bilaterally [de-identified] : right medial malleolar ankle ulcer down to skin, subcutaneous tissue, fat, epithelialized [de-identified] : Circ neurovascular function WNL post Circaid wrap, pt expressed comfort. [FreeTextEntry1] : Right Medial Ankle- Scab [FreeTextEntry2] : 0.7 [FreeTextEntry3] : 0.8 [FreeTextEntry4] : 0.1 [de-identified] : Intact [de-identified] : Curad Bandage [de-identified] : Cleansed with Normal Saline\par  [TWNoteComboBox4] : None [TWNoteComboBox5] : No [de-identified] : No [de-identified] : Normal [de-identified] : None [de-identified] : None [de-identified] : 100% [de-identified] : No [de-identified] : Circaid wrap [de-identified] : Daily [de-identified] : Primary Dressing

## 2021-02-04 DIAGNOSIS — Z98.1 ARTHRODESIS STATUS: ICD-10-CM

## 2021-02-04 DIAGNOSIS — D50.9 IRON DEFICIENCY ANEMIA, UNSPECIFIED: ICD-10-CM

## 2021-02-04 DIAGNOSIS — Z99.3 DEPENDENCE ON WHEELCHAIR: ICD-10-CM

## 2021-02-04 DIAGNOSIS — Z80.0 FAMILY HISTORY OF MALIGNANT NEOPLASM OF DIGESTIVE ORGANS: ICD-10-CM

## 2021-02-04 DIAGNOSIS — Z87.81 PERSONAL HISTORY OF (HEALED) TRAUMATIC FRACTURE: ICD-10-CM

## 2021-02-04 DIAGNOSIS — R26.9 UNSPECIFIED ABNORMALITIES OF GAIT AND MOBILITY: ICD-10-CM

## 2021-02-04 DIAGNOSIS — Z87.828 PERSONAL HISTORY OF OTHER (HEALED) PHYSICAL INJURY AND TRAUMA: ICD-10-CM

## 2021-02-04 DIAGNOSIS — Z79.891 LONG TERM (CURRENT) USE OF OPIATE ANALGESIC: ICD-10-CM

## 2021-02-04 DIAGNOSIS — L89.512 PRESSURE ULCER OF RIGHT ANKLE, STAGE 2: ICD-10-CM

## 2021-02-04 DIAGNOSIS — R33.9 RETENTION OF URINE, UNSPECIFIED: ICD-10-CM

## 2021-02-04 DIAGNOSIS — Z79.899 OTHER LONG TERM (CURRENT) DRUG THERAPY: ICD-10-CM

## 2021-02-04 DIAGNOSIS — I83.891 VARICOSE VEINS OF RIGHT LOWER EXTREMITY WITH OTHER COMPLICATIONS: ICD-10-CM

## 2021-02-04 DIAGNOSIS — Z82.49 FAMILY HISTORY OF ISCHEMIC HEART DISEASE AND OTHER DISEASES OF THE CIRCULATORY SYSTEM: ICD-10-CM

## 2021-02-04 DIAGNOSIS — G82.20 PARAPLEGIA, UNSPECIFIED: ICD-10-CM

## 2021-02-24 ENCOUNTER — APPOINTMENT (OUTPATIENT)
Dept: WOUND CARE | Facility: HOSPITAL | Age: 71
End: 2021-02-24
Payer: MEDICARE

## 2021-02-24 ENCOUNTER — OUTPATIENT (OUTPATIENT)
Dept: OUTPATIENT SERVICES | Facility: HOSPITAL | Age: 71
LOS: 1 days | Discharge: HOME | End: 2021-02-24
Payer: MEDICARE

## 2021-02-24 VITALS
RESPIRATION RATE: 20 BRPM | HEART RATE: 90 BPM | DIASTOLIC BLOOD PRESSURE: 87 MMHG | WEIGHT: 185 LBS | SYSTOLIC BLOOD PRESSURE: 140 MMHG | BODY MASS INDEX: 28.04 KG/M2 | OXYGEN SATURATION: 98 % | HEIGHT: 68 IN | TEMPERATURE: 97.6 F

## 2021-02-24 DIAGNOSIS — Z87.81 PERSONAL HISTORY OF (HEALED) TRAUMATIC FRACTURE: ICD-10-CM

## 2021-02-24 DIAGNOSIS — R33.9 RETENTION OF URINE, UNSPECIFIED: ICD-10-CM

## 2021-02-24 DIAGNOSIS — Z87.828 PERSONAL HISTORY OF OTHER (HEALED) PHYSICAL INJURY AND TRAUMA: ICD-10-CM

## 2021-02-24 DIAGNOSIS — L89.512 PRESSURE ULCER OF RIGHT ANKLE, STAGE 2: ICD-10-CM

## 2021-02-24 DIAGNOSIS — I83.891 VARICOSE VEINS OF RIGHT LOWER EXTREMITY WITH OTHER COMPLICATIONS: ICD-10-CM

## 2021-02-24 DIAGNOSIS — G82.20 PARAPLEGIA, UNSPECIFIED: ICD-10-CM

## 2021-02-24 DIAGNOSIS — Z80.0 FAMILY HISTORY OF MALIGNANT NEOPLASM OF DIGESTIVE ORGANS: ICD-10-CM

## 2021-02-24 DIAGNOSIS — Z99.3 DEPENDENCE ON WHEELCHAIR: ICD-10-CM

## 2021-02-24 DIAGNOSIS — Z79.899 OTHER LONG TERM (CURRENT) DRUG THERAPY: ICD-10-CM

## 2021-02-24 DIAGNOSIS — L97.301 NON-PRESSURE CHRONIC ULCER OF UNSPECIFIED ANKLE LIMITED TO BREAKDOWN OF SKIN: ICD-10-CM

## 2021-02-24 DIAGNOSIS — Z82.49 FAMILY HISTORY OF ISCHEMIC HEART DISEASE AND OTHER DISEASES OF THE CIRCULATORY SYSTEM: ICD-10-CM

## 2021-02-24 DIAGNOSIS — Z98.1 ARTHRODESIS STATUS: ICD-10-CM

## 2021-02-24 DIAGNOSIS — Z79.891 LONG TERM (CURRENT) USE OF OPIATE ANALGESIC: ICD-10-CM

## 2021-02-24 DIAGNOSIS — R26.9 UNSPECIFIED ABNORMALITIES OF GAIT AND MOBILITY: ICD-10-CM

## 2021-02-24 DIAGNOSIS — D50.9 IRON DEFICIENCY ANEMIA, UNSPECIFIED: ICD-10-CM

## 2021-02-24 PROCEDURE — 99213 OFFICE O/P EST LOW 20 MIN: CPT

## 2021-02-24 PROCEDURE — G0463: CPT

## 2021-02-24 NOTE — PLAN
[FreeTextEntry1] : Patient examined and evaluated at this time.\par Continue local wound care and offloading.\par Pt to follow up in 4 weeks.\par Pt to use circaid compression.\par Spent 20 minutes for patient care and medical decision making.\par

## 2021-02-24 NOTE — REVIEW OF SYSTEMS
[Fever] : no fever [Eye Pain] : no eye pain [Earache] : no earache [Chest Pain] : no chest pain [Shortness Of Breath] : no shortness of breath [Cough] : no cough [Abdominal Pain] : no abdominal pain [Skin Wound] : skin wound [FreeTextEntry9] : paraplegia [de-identified] : right ankle ulcer down to skin, subcutaneous tissue, fat, epithelialized

## 2021-02-24 NOTE — ASSESSMENT
[Verbal] : Verbal [Written] : Written [Demo] : Demo [Patient] : Patient [Good - alert, interested, motivated] : Good - alert, interested, motivated [Verbalizes knowledge/Understanding] : Verbalizes knowledge/understanding [Dressing changes] : dressing changes [Foot Care] : foot care [Skin Care] : skin care [Signs and symptoms of infection] : sign and symptoms of infection [Venous Disease] : venous disease [Nutrition] : nutrition [How and When to Call] : how and when to call [Pain Management] : pain management [Patient responsibility to plan of care] : patient responsibility to plan of care [] : Yes [Stable] : stable [Home] : Home [Wheelchair] : Wheelchair [Not Applicable - Long Term Care/Home Health Agency] : Long Term Care/Home Health Agency: Not Applicable [FreeTextEntry2] : Infection prevention\par Localized wound care \par Goal of remaining pain free regarding wounds.\par Compression therapy  [FreeTextEntry4] : Follow up in 4 weeks

## 2021-02-24 NOTE — PHYSICAL EXAM
[2 x 2] : 2 x 2  [1+] : left 1+ [Ankle Swelling (On Exam)] : present [Ankle Swelling On The Right] : of the right ankle [Varicose Veins Of Lower Extremities] : present [Varicose Veins Of The Right Leg] : of the right leg [] : not present [Ankle Swelling On The Left] : moderate [Skin Ulcer] : ulcer [Calm] : calm [de-identified] : A&Ox3, NAD [de-identified] : 1/5 strength in all quadrants bilaterally [de-identified] : right medial malleolar ankle ulcer down to skin, subcutaneous tissue, fat, epithelialized [FreeTextEntry1] : Right medial ankle  [FreeTextEntry2] : 0.1 [FreeTextEntry3] : 0.1 [FreeTextEntry4] : 0.1 [de-identified] : Scant sanguinous [de-identified] : Silver alginate [de-identified] : Cleansed with NS\par Tegaderm \par \par ** Bandage applied by DPM [de-identified] : Normal [de-identified] : None [de-identified] : None [de-identified] : 100% [de-identified] : No [de-identified] : Circaid wrap [de-identified] : 3x Weekly [de-identified] : Primary Dressing

## 2021-03-24 ENCOUNTER — APPOINTMENT (OUTPATIENT)
Dept: WOUND CARE | Facility: HOSPITAL | Age: 71
End: 2021-03-24
Payer: MEDICARE

## 2021-03-24 ENCOUNTER — OUTPATIENT (OUTPATIENT)
Dept: OUTPATIENT SERVICES | Facility: HOSPITAL | Age: 71
LOS: 1 days | Discharge: HOME | End: 2021-03-24
Payer: MEDICARE

## 2021-03-24 VITALS
RESPIRATION RATE: 20 BRPM | TEMPERATURE: 97.6 F | SYSTOLIC BLOOD PRESSURE: 125 MMHG | WEIGHT: 185 LBS | OXYGEN SATURATION: 99 % | BODY MASS INDEX: 28.04 KG/M2 | HEIGHT: 68 IN | HEART RATE: 96 BPM | DIASTOLIC BLOOD PRESSURE: 79 MMHG

## 2021-03-24 DIAGNOSIS — I83.891 VARICOSE VEINS OF RIGHT LOWER EXTREMITY WITH OTHER COMPLICATIONS: ICD-10-CM

## 2021-03-24 DIAGNOSIS — Z98.1 ARTHRODESIS STATUS: ICD-10-CM

## 2021-03-24 DIAGNOSIS — R26.9 UNSPECIFIED ABNORMALITIES OF GAIT AND MOBILITY: ICD-10-CM

## 2021-03-24 DIAGNOSIS — L97.301 NON-PRESSURE CHRONIC ULCER OF UNSPECIFIED ANKLE LIMITED TO BREAKDOWN OF SKIN: ICD-10-CM

## 2021-03-24 DIAGNOSIS — G82.20 PARAPLEGIA, UNSPECIFIED: ICD-10-CM

## 2021-03-24 DIAGNOSIS — L89.512 PRESSURE ULCER OF RIGHT ANKLE, STAGE 2: ICD-10-CM

## 2021-03-24 DIAGNOSIS — Z79.891 LONG TERM (CURRENT) USE OF OPIATE ANALGESIC: ICD-10-CM

## 2021-03-24 DIAGNOSIS — Z99.3 DEPENDENCE ON WHEELCHAIR: ICD-10-CM

## 2021-03-24 DIAGNOSIS — D50.9 IRON DEFICIENCY ANEMIA, UNSPECIFIED: ICD-10-CM

## 2021-03-24 DIAGNOSIS — Z82.49 FAMILY HISTORY OF ISCHEMIC HEART DISEASE AND OTHER DISEASES OF THE CIRCULATORY SYSTEM: ICD-10-CM

## 2021-03-24 DIAGNOSIS — Z79.899 OTHER LONG TERM (CURRENT) DRUG THERAPY: ICD-10-CM

## 2021-03-24 DIAGNOSIS — Z87.828 PERSONAL HISTORY OF OTHER (HEALED) PHYSICAL INJURY AND TRAUMA: ICD-10-CM

## 2021-03-24 DIAGNOSIS — Z80.0 FAMILY HISTORY OF MALIGNANT NEOPLASM OF DIGESTIVE ORGANS: ICD-10-CM

## 2021-03-24 DIAGNOSIS — Z87.81 PERSONAL HISTORY OF (HEALED) TRAUMATIC FRACTURE: ICD-10-CM

## 2021-03-24 DIAGNOSIS — R33.9 RETENTION OF URINE, UNSPECIFIED: ICD-10-CM

## 2021-03-24 PROCEDURE — G0463: CPT

## 2021-03-24 PROCEDURE — 99213 OFFICE O/P EST LOW 20 MIN: CPT

## 2021-03-24 NOTE — REVIEW OF SYSTEMS
[Fever] : no fever [Eye Pain] : no eye pain [Earache] : no earache [Chest Pain] : no chest pain [Shortness Of Breath] : no shortness of breath [Cough] : no cough [Abdominal Pain] : no abdominal pain [Skin Wound] : skin wound [FreeTextEntry9] : paraplegia [de-identified] : right ankle ulcer down to skin, subcutaneous tissue, fat, epithelialized

## 2021-03-24 NOTE — ASSESSMENT
[Verbal] : Verbal [Written] : Written [Demo] : Demo [Patient] : Patient [Good - alert, interested, motivated] : Good - alert, interested, motivated [Dressing changes] : dressing changes [Foot Care] : foot care [Skin Care] : skin care [Signs and symptoms of infection] : sign and symptoms of infection [Nutrition] : nutrition [How and When to Call] : how and when to call [Patient responsibility to plan of care] : patient responsibility to plan of care [] : Yes [Stable] : stable [Home] : Home [Wheelchair] : Wheelchair [Not Applicable - Long Term Care/Home Health Agency] : Long Term Care/Home Health Agency: Not Applicable [FreeTextEntry2] : Infection prevention\par Localized wound care \par Goal of remaining pain free regarding wounds.\par  [FreeTextEntry4] : Follow up in 4 weeks

## 2021-03-24 NOTE — PHYSICAL EXAM
[1+] : left 1+ [Ankle Swelling (On Exam)] : present [Ankle Swelling On The Right] : of the right ankle [Varicose Veins Of Lower Extremities] : present [Varicose Veins Of The Right Leg] : of the right leg [Ankle Swelling On The Left] : moderate [] : not present [Skin Ulcer] : ulcer [Calm] : calm [de-identified] : A&Ox3, NAD [de-identified] : 1/5 strength in all quadrants bilaterally [de-identified] : right medial malleolar ankle ulcer down to skin, subcutaneous tissue, fat, epithelialized [FreeTextEntry1] : Right medial ankle - Dry scab - no open wound  [de-identified] : M [de-identified] : Band - aid  [de-identified] : Cleansed with NS\par  [TWNoteComboBox4] : None [de-identified] : Normal [de-identified] : None [de-identified] : None [de-identified] : No [de-identified] : Daily [de-identified] : Secondary Dressing

## 2021-04-28 ENCOUNTER — APPOINTMENT (OUTPATIENT)
Dept: WOUND CARE | Facility: HOSPITAL | Age: 71
End: 2021-04-28

## 2024-12-23 ENCOUNTER — APPOINTMENT (OUTPATIENT)
Dept: SURGERY | Facility: CLINIC | Age: 74
End: 2024-12-23
Payer: MEDICARE

## 2024-12-23 ENCOUNTER — NON-APPOINTMENT (OUTPATIENT)
Age: 74
End: 2024-12-23

## 2024-12-23 DIAGNOSIS — R10.32 LEFT LOWER QUADRANT PAIN: ICD-10-CM

## 2024-12-23 PROCEDURE — 99202 OFFICE O/P NEW SF 15 MIN: CPT

## 2024-12-31 ENCOUNTER — APPOINTMENT (OUTPATIENT)
Dept: SURGERY | Facility: CLINIC | Age: 74
End: 2024-12-31
Payer: MEDICARE

## 2024-12-31 DIAGNOSIS — K80.20 CALCULUS OF GALLBLADDER W/OUT CHOLECYSTITIS W/OUT OBSTRUCTION: ICD-10-CM

## 2024-12-31 DIAGNOSIS — M54.50 LOW BACK PAIN, UNSPECIFIED: ICD-10-CM

## 2024-12-31 DIAGNOSIS — Z87.448 PERSONAL HISTORY OF OTHER DISEASES OF URINARY SYSTEM: ICD-10-CM

## 2024-12-31 PROBLEM — K40.90 LEFT INGUINAL HERNIA: Status: ACTIVE | Noted: 2024-12-31

## 2024-12-31 PROCEDURE — 99213 OFFICE O/P EST LOW 20 MIN: CPT

## 2025-01-07 ENCOUNTER — APPOINTMENT (OUTPATIENT)
Dept: SURGERY | Facility: CLINIC | Age: 75
End: 2025-01-07
Payer: MEDICARE

## 2025-01-07 DIAGNOSIS — L03.314 CELLULITIS OF GROIN: ICD-10-CM

## 2025-01-07 DIAGNOSIS — K40.90 UNILATERAL INGUINAL HERNIA, W/OUT OBSTRUCTION OR GANGRENE, NOT SPECIFIED AS RECURRENT: ICD-10-CM

## 2025-01-07 PROCEDURE — 99214 OFFICE O/P EST MOD 30 MIN: CPT | Mod: 57

## 2025-01-07 RX ORDER — BUTENAFINE HCL 1 %
1 CREAM (GRAM) TOPICAL DAILY
Qty: 1 | Refills: 3 | Status: ACTIVE | COMMUNITY
Start: 2025-01-07 | End: 1900-01-01

## 2025-01-13 ENCOUNTER — OUTPATIENT (OUTPATIENT)
Dept: OUTPATIENT SERVICES | Facility: HOSPITAL | Age: 75
LOS: 1 days | End: 2025-01-13
Payer: MEDICARE

## 2025-01-13 VITALS
SYSTOLIC BLOOD PRESSURE: 140 MMHG | OXYGEN SATURATION: 98 % | WEIGHT: 190.04 LBS | HEIGHT: 68 IN | DIASTOLIC BLOOD PRESSURE: 80 MMHG | RESPIRATION RATE: 16 BRPM | TEMPERATURE: 98 F | HEART RATE: 84 BPM

## 2025-01-13 DIAGNOSIS — K40.90 UNILATERAL INGUINAL HERNIA, WITHOUT OBSTRUCTION OR GANGRENE, NOT SPECIFIED AS RECURRENT: ICD-10-CM

## 2025-01-13 DIAGNOSIS — Z98.890 OTHER SPECIFIED POSTPROCEDURAL STATES: Chronic | ICD-10-CM

## 2025-01-13 DIAGNOSIS — Z01.818 ENCOUNTER FOR OTHER PREPROCEDURAL EXAMINATION: ICD-10-CM

## 2025-01-13 DIAGNOSIS — M79.2 NEURALGIA AND NEURITIS, UNSPECIFIED: ICD-10-CM

## 2025-01-13 DIAGNOSIS — Z87.81 PERSONAL HISTORY OF (HEALED) TRAUMATIC FRACTURE: Chronic | ICD-10-CM

## 2025-01-13 LAB
ANION GAP SERPL CALC-SCNC: 6 MMOL/L — SIGNIFICANT CHANGE UP (ref 5–17)
BUN SERPL-MCNC: 23 MG/DL — SIGNIFICANT CHANGE UP (ref 7–23)
CALCIUM SERPL-MCNC: 9.4 MG/DL — SIGNIFICANT CHANGE UP (ref 8.5–10.1)
CHLORIDE SERPL-SCNC: 107 MMOL/L — SIGNIFICANT CHANGE UP (ref 96–108)
CO2 SERPL-SCNC: 29 MMOL/L — SIGNIFICANT CHANGE UP (ref 22–31)
CREAT SERPL-MCNC: 0.82 MG/DL — SIGNIFICANT CHANGE UP (ref 0.5–1.3)
EGFR: 92 ML/MIN/1.73M2 — SIGNIFICANT CHANGE UP
GLUCOSE SERPL-MCNC: 109 MG/DL — HIGH (ref 70–99)
HCT VFR BLD CALC: 44.9 % — SIGNIFICANT CHANGE UP (ref 39–50)
HGB BLD-MCNC: 15.2 G/DL — SIGNIFICANT CHANGE UP (ref 13–17)
MCHC RBC-ENTMCNC: 28.6 PG — SIGNIFICANT CHANGE UP (ref 27–34)
MCHC RBC-ENTMCNC: 33.9 G/DL — SIGNIFICANT CHANGE UP (ref 32–36)
MCV RBC AUTO: 84.6 FL — SIGNIFICANT CHANGE UP (ref 80–100)
NRBC # BLD: 0 /100 WBCS — SIGNIFICANT CHANGE UP (ref 0–0)
PLATELET # BLD AUTO: 186 K/UL — SIGNIFICANT CHANGE UP (ref 150–400)
POTASSIUM SERPL-MCNC: 4.1 MMOL/L — SIGNIFICANT CHANGE UP (ref 3.5–5.3)
POTASSIUM SERPL-SCNC: 4.1 MMOL/L — SIGNIFICANT CHANGE UP (ref 3.5–5.3)
RBC # BLD: 5.31 M/UL — SIGNIFICANT CHANGE UP (ref 4.2–5.8)
RBC # FLD: 16 % — HIGH (ref 10.3–14.5)
SODIUM SERPL-SCNC: 142 MMOL/L — SIGNIFICANT CHANGE UP (ref 135–145)
WBC # BLD: 6.9 K/UL — SIGNIFICANT CHANGE UP (ref 3.8–10.5)
WBC # FLD AUTO: 6.9 K/UL — SIGNIFICANT CHANGE UP (ref 3.8–10.5)

## 2025-01-13 PROCEDURE — 85027 COMPLETE CBC AUTOMATED: CPT

## 2025-01-13 PROCEDURE — 93010 ELECTROCARDIOGRAM REPORT: CPT

## 2025-01-13 PROCEDURE — 36415 COLL VENOUS BLD VENIPUNCTURE: CPT

## 2025-01-13 PROCEDURE — G0463: CPT

## 2025-01-13 PROCEDURE — 80048 BASIC METABOLIC PNL TOTAL CA: CPT

## 2025-01-13 PROCEDURE — 93005 ELECTROCARDIOGRAM TRACING: CPT

## 2025-01-13 NOTE — H&P PST ADULT - HISTORY OF PRESENT ILLNESS
75 yo M with h/o spinal cord injury (T12- 2009), paraplegic- w/c bound- c/o left groin bulge &  discomfort x 2 months. Had surgical consult- s/p abdominal sonogram/ CT scan abdomen  demonstrated left inguinal hernia. Pt scheduled for open repair left inguinal hernia on 01/22/25    **Denies any fever, abdominal pain, N/V , CP/SOB or sick contacts

## 2025-01-13 NOTE — H&P PST ADULT - NSICDXPASTSURGICALHX_GEN_ALL_CORE_FT
PAST SURGICAL HISTORY:  H/O hemorrhoidectomy     History of colonoscopy     Status post closed fracture of right femur

## 2025-01-13 NOTE — H&P PST ADULT - NEUROLOGICAL COMMENTS
Contacted patient per PAM Henry as she was discharged without scheduled post op appointment. No answer. Voicemail left requesting return call.    w/.c bound- paraplegia

## 2025-01-13 NOTE — H&P PST ADULT - NSICDXPASTMEDICALHX_GEN_ALL_CORE_FT
PAST MEDICAL HISTORY:  Bladder calculus     Fe deficiency anemia     Gall bladder stones     H/O hemorrhoids     History of paraplegia     History of spinal cord injury     Inguinal hernia, left     Neurogenic bladder      PAST MEDICAL HISTORY:  Bladder calculus     Fall as cause of accidental injury in industrial or construction area as place of occurrence     Fe deficiency anemia     Gall bladder stones     H/O hemorrhoids     H/O traumatic subdural hematoma     History of femur fracture     History of paraplegia     History of spinal cord injury     Inguinal hernia, left     Neurogenic bladder     T12 vertebral fracture

## 2025-01-13 NOTE — H&P PST ADULT - TOBACCO USE
Due to the large size of the cyst, I recommend referral to Dr Vaca for surgical excision.  We discussed possible I&D as an alternative treatment option but patient is aware the cyst is more likely to grow back with that and he prefers excision.
Detail Level: Zone
Never smoker

## 2025-01-13 NOTE — H&P PST ADULT - PROBLEM SELECTOR PLAN 1
open repair left inguinal hernia on 01/22/25  labs- CBC, BMP, EKG  Pre op instructions discussed, verbalized understanding  Pre op Medical eval on 1/14/24

## 2025-01-13 NOTE — H&P PST ADULT - NSICDXFAMILYHX_GEN_ALL_CORE_FT
FAMILY HISTORY:  Father  Still living? No  Family history of colon cancer in father, Age at diagnosis: Age Unknown    Mother  Still living? No  FH: HTN (hypertension), Age at diagnosis: Age Unknown

## 2025-01-21 NOTE — ASU PATIENT PROFILE, ADULT - FALL HARM RISK - HARM RISK INTERVENTIONS

## 2025-01-21 NOTE — ASU PATIENT PROFILE, ADULT - NSICDXPASTMEDICALHX_GEN_ALL_CORE_FT
PAST MEDICAL HISTORY:  Bladder calculus     Fall as cause of accidental injury in industrial or construction area as place of occurrence     Fe deficiency anemia     Gall bladder stones     H/O hemorrhoids     H/O traumatic subdural hematoma     History of femur fracture     History of paraplegia     History of spinal cord injury     Inguinal hernia, left     Neurogenic bladder     T12 vertebral fracture

## 2025-01-21 NOTE — ASU PATIENT PROFILE, ADULT - CAREGIVER
From: Josh Bliss  To: Jairon Brumfield  Sent: 9/10/2024 10:45 PM CDT  Subject: Question    Hey doctor sorry about today I had thought the appointment was in [place]     Question can I lift weights this week? I feel better but I want to contact you first    No

## 2025-01-22 ENCOUNTER — OUTPATIENT (OUTPATIENT)
Dept: OUTPATIENT SERVICES | Facility: HOSPITAL | Age: 75
LOS: 1 days | End: 2025-01-22
Payer: MEDICARE

## 2025-01-22 ENCOUNTER — RESULT REVIEW (OUTPATIENT)
Age: 75
End: 2025-01-22

## 2025-01-22 ENCOUNTER — TRANSCRIPTION ENCOUNTER (OUTPATIENT)
Age: 75
End: 2025-01-22

## 2025-01-22 ENCOUNTER — APPOINTMENT (OUTPATIENT)
Dept: SURGERY | Facility: HOSPITAL | Age: 75
End: 2025-01-22

## 2025-01-22 VITALS
RESPIRATION RATE: 10 BRPM | OXYGEN SATURATION: 98 % | DIASTOLIC BLOOD PRESSURE: 94 MMHG | SYSTOLIC BLOOD PRESSURE: 144 MMHG | TEMPERATURE: 98 F | HEART RATE: 67 BPM

## 2025-01-22 VITALS
DIASTOLIC BLOOD PRESSURE: 87 MMHG | RESPIRATION RATE: 15 BRPM | HEART RATE: 87 BPM | OXYGEN SATURATION: 97 % | SYSTOLIC BLOOD PRESSURE: 140 MMHG

## 2025-01-22 DIAGNOSIS — Z01.818 ENCOUNTER FOR OTHER PREPROCEDURAL EXAMINATION: ICD-10-CM

## 2025-01-22 DIAGNOSIS — Z98.890 OTHER SPECIFIED POSTPROCEDURAL STATES: Chronic | ICD-10-CM

## 2025-01-22 DIAGNOSIS — Z87.81 PERSONAL HISTORY OF (HEALED) TRAUMATIC FRACTURE: Chronic | ICD-10-CM

## 2025-01-22 DIAGNOSIS — K40.90 UNILATERAL INGUINAL HERNIA, WITHOUT OBSTRUCTION OR GANGRENE, NOT SPECIFIED AS RECURRENT: ICD-10-CM

## 2025-01-22 PROCEDURE — 88304 TISSUE EXAM BY PATHOLOGIST: CPT

## 2025-01-22 PROCEDURE — 88304 TISSUE EXAM BY PATHOLOGIST: CPT | Mod: 26

## 2025-01-22 PROCEDURE — 49507 PRP I/HERN INIT BLOCK >5 YR: CPT | Mod: LT

## 2025-01-22 PROCEDURE — 55520 REMOVAL OF SPERM CORD LESION: CPT | Mod: 59,LT

## 2025-01-22 PROCEDURE — C1781: CPT

## 2025-01-22 DEVICE — MESH HERNIA PRE-SHAPED KEYHOLE 2.32X5.4IN: Type: IMPLANTABLE DEVICE | Status: FUNCTIONAL

## 2025-01-22 DEVICE — MESH HERNIA ULTRAPRO PLUG MEDIUM 5 X 4CM: Type: IMPLANTABLE DEVICE | Status: FUNCTIONAL

## 2025-01-22 DEVICE — MESH HERNIA ULTRAPRO PLUG LARGE 5 X 5CM: Type: IMPLANTABLE DEVICE | Status: FUNCTIONAL

## 2025-01-22 DEVICE — CATH ON-Q SILVERSOAKER EXPANSION 2.5": Type: IMPLANTABLE DEVICE | Status: FUNCTIONAL

## 2025-01-22 RX ORDER — OXYCODONE HCL 15 MG
1 TABLET ORAL
Refills: 0 | DISCHARGE

## 2025-01-22 RX ORDER — SODIUM CHLORIDE 9 G/ML
1000 INJECTION, SOLUTION INTRAVENOUS
Refills: 0 | Status: DISCONTINUED | OUTPATIENT
Start: 2025-01-22 | End: 2025-01-22

## 2025-01-22 RX ORDER — BACLOFEN 10 MG/1
1 TABLET ORAL
Refills: 0 | DISCHARGE

## 2025-01-22 RX ORDER — CEFAZOLIN SODIUM IN 0.9 % NACL 2 G/10 ML
2000 SYRINGE (ML) INTRAVENOUS ONCE
Refills: 0 | Status: COMPLETED | OUTPATIENT
Start: 2025-01-22 | End: 2025-01-22

## 2025-01-22 RX ORDER — HYDROMORPHONE HYDROCHLORIDE 4 MG/ML
0.5 INJECTION, SOLUTION INTRAMUSCULAR; INTRAVENOUS; SUBCUTANEOUS
Refills: 0 | Status: DISCONTINUED | OUTPATIENT
Start: 2025-01-22 | End: 2025-01-22

## 2025-01-22 RX ORDER — OXYCODONE HYDROCHLORIDE 30 MG/1
5 TABLET ORAL ONCE
Refills: 0 | Status: DISCONTINUED | OUTPATIENT
Start: 2025-01-22 | End: 2025-01-22

## 2025-01-22 RX ORDER — ONDANSETRON 4 MG/1
4 TABLET, ORALLY DISINTEGRATING ORAL ONCE
Refills: 0 | Status: DISCONTINUED | OUTPATIENT
Start: 2025-01-22 | End: 2025-01-22

## 2025-01-22 RX ORDER — METOPROLOL TARTRATE 50 MG
1 TABLET ORAL
Refills: 0 | DISCHARGE

## 2025-01-22 RX ORDER — OXYBUTYNIN CHLORIDE 5 MG/1
1 TABLET ORAL
Refills: 0 | DISCHARGE

## 2025-01-22 NOTE — BRIEF OPERATIVE NOTE - COMMENTS
IButch PA-C provided direct first assist support to the surgeon during this surgical procedure. My involvement included positioning, prepping and draping the patient prior to surgery, ensuring clear visibility and exposure for the surgeon by using instruments such as retractors, suction and sponges, stapling tissues and vessels, retrieving specimens from the operative field, closing surgical incisions, and dressing wounds.  As well as other tasks as directed by the surgeon.

## 2025-01-22 NOTE — ASU DISCHARGE PLAN (ADULT/PEDIATRIC) - CARE PROVIDER_API CALL
Ryan Arenas  Surgery  [address] [place] [address], [address]  [place] [address]  Phone: [phone]  Fax: [phone]  Follow Up Time:

## 2025-01-22 NOTE — BRIEF OPERATIVE NOTE - NSICDXBRIEFPROCEDURE_GEN_ALL_CORE_FT
PROCEDURES:  Open repair of inguinal hernia using mesh in adult 22-Jan-2025 09:06:25 left inguinal hernia Butch Gonzalez

## 2025-01-22 NOTE — ASU DISCHARGE PLAN (ADULT/PEDIATRIC) - FINANCIAL ASSISTANCE
[facility] provides services at a reduced cost to those who are determined to be eligible through [facility]’s financial assistance program. Information regarding [facility]’s financial assistance program can be found by going to [email or web address] or by calling [phone].

## 2025-01-22 NOTE — ASU DISCHARGE PLAN (ADULT/PEDIATRIC) - ASU DC SPECIAL INSTRUCTIONSFT
You may remove the clear adhesive and gauze dressings and shower in 48 hours.  Do not remove steri-strips.  Do not let water hit wounds directly, do not rub incisions.      No heavy lifting (nothing heavier than 5 lbs.), no strenuous physical activity, no exercise.      You may perform your usual daily tasks as tolerated.    You may resume your usual daily diet as tolerated.             Keep your legs elevated as much as possible (with your feet 18-20 inches above your heart) to help prevent pain/throbbing/swelling at surgical site.     Keep the abdominal binder in place until follow-up with Dr. Arenas (you may remove it for short periods for comfort and when showering).      Continue use of the incentive spirometer 10x an hour to maintain lung health after surgery.     Follow-up with Dr. Arenas in his office next week - call office for appointment if not already made.

## 2025-01-23 PROBLEM — Z87.81 PERSONAL HISTORY OF (HEALED) TRAUMATIC FRACTURE: Chronic | Status: ACTIVE | Noted: 2025-01-14

## 2025-01-23 PROBLEM — Z87.828 PERSONAL HISTORY OF OTHER (HEALED) PHYSICAL INJURY AND TRAUMA: Chronic | Status: ACTIVE | Noted: 2025-01-14

## 2025-01-23 PROBLEM — D50.9 IRON DEFICIENCY ANEMIA, UNSPECIFIED: Chronic | Status: ACTIVE | Noted: 2025-01-13

## 2025-01-23 PROBLEM — S22.089A UNSPECIFIED FRACTURE OF T11-T12 VERTEBRA, INITIAL ENCOUNTER FOR CLOSED FRACTURE: Chronic | Status: ACTIVE | Noted: 2025-01-14

## 2025-01-23 PROBLEM — K80.20 CALCULUS OF GALLBLADDER WITHOUT CHOLECYSTITIS WITHOUT OBSTRUCTION: Chronic | Status: ACTIVE | Noted: 2025-01-13
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
## 2025-01-23 LAB — SURGICAL PATHOLOGY STUDY: SIGNIFICANT CHANGE UP

## 2025-01-28 ENCOUNTER — APPOINTMENT (OUTPATIENT)
Dept: SURGERY | Facility: CLINIC | Age: 75
End: 2025-01-28
Payer: MEDICARE

## 2025-01-28 DIAGNOSIS — Z98.890 OTHER SPECIFIED POSTPROCEDURAL STATES: ICD-10-CM

## 2025-01-28 DIAGNOSIS — Z87.19 OTHER SPECIFIED POSTPROCEDURAL STATES: ICD-10-CM

## 2025-01-28 PROBLEM — N21.0 CALCULUS IN BLADDER: Chronic | Status: ACTIVE | Noted: 2025-01-13

## 2025-01-28 PROBLEM — Z87.828 PERSONAL HISTORY OF OTHER (HEALED) PHYSICAL INJURY AND TRAUMA: Chronic | Status: ACTIVE | Noted: 2025-01-13

## 2025-01-28 PROBLEM — N31.9 NEUROMUSCULAR DYSFUNCTION OF BLADDER, UNSPECIFIED: Chronic | Status: ACTIVE | Noted: 2025-01-13

## 2025-01-28 PROBLEM — K40.90 UNILATERAL INGUINAL HERNIA, WITHOUT OBSTRUCTION OR GANGRENE, NOT SPECIFIED AS RECURRENT: Chronic | Status: ACTIVE | Noted: 2025-01-13

## 2025-01-28 PROBLEM — Z86.69 PERSONAL HISTORY OF OTHER DISEASES OF THE NERVOUS SYSTEM AND SENSE ORGANS: Chronic | Status: ACTIVE | Noted: 2025-01-13

## 2025-01-28 PROCEDURE — 99024 POSTOP FOLLOW-UP VISIT: CPT

## 2025-02-25 ENCOUNTER — APPOINTMENT (OUTPATIENT)
Dept: SURGERY | Facility: CLINIC | Age: 75
End: 2025-02-25
Payer: MEDICARE

## 2025-02-25 DIAGNOSIS — Z98.890 OTHER SPECIFIED POSTPROCEDURAL STATES: ICD-10-CM

## 2025-02-25 DIAGNOSIS — Z87.19 OTHER SPECIFIED POSTPROCEDURAL STATES: ICD-10-CM

## 2025-02-25 PROCEDURE — 99024 POSTOP FOLLOW-UP VISIT: CPT

## (undated) DEVICE — DRAIN PENROSE 5/8" X 18" LATEX

## (undated) DEVICE — NDL HYPO SAFE 25G X 1.5" (ORANGE)

## (undated) DEVICE — SUT MONOCRYL 4-0 27" PS-2 UNDYED

## (undated) DEVICE — SOL IRR POUR H2O 1000ML

## (undated) DEVICE — VENODYNE/SCD SLEEVE CALF LARGE

## (undated) DEVICE — GOWN TRIMAX LG

## (undated) DEVICE — SUT POLYSORB 2-0 30" GS-22 UNDYED

## (undated) DEVICE — SUT SURGIPRO 0 30" GS-22

## (undated) DEVICE — BLADE SCALPEL SAFETYLOCK #15

## (undated) DEVICE — DRAPE TOWEL BLUE 17" X 24"

## (undated) DEVICE — ELCTR BOVIE PENCIL SMOKE EVACUATION

## (undated) DEVICE — DRSG DERMABOND MINI

## (undated) DEVICE — PLV/PSP-ESU FORCEFX F8I7418A: Type: DURABLE MEDICAL EQUIPMENT

## (undated) DEVICE — SUT POLYSORB 3-0 30" V-20 UNDYED

## (undated) DEVICE — VENODYNE/SCD SLEEVE CALF MEDIUM

## (undated) DEVICE — PLV-SCD MACHINE: Type: DURABLE MEDICAL EQUIPMENT

## (undated) DEVICE — PACK MINOR

## (undated) DEVICE — WARMING BLANKET UPPER ADULT

## (undated) DEVICE — SUT POLYSORB 2-0 60" REEL

## (undated) DEVICE — DRAPE 3/4 SHEET 52X76"

## (undated) DEVICE — DRSG MASTISOL

## (undated) DEVICE — ELCTR BOVIE TIP BLADE INSULATED 2.75" EDGE

## (undated) DEVICE — SOL IRR POUR NS 0.9% 1000ML

## (undated) DEVICE — GLV 8 PROTEXIS (WHITE)

## (undated) DEVICE — DRSG CURITY GAUZE SPONGE 4 X 4" 12-PLY

## (undated) DEVICE — DRSG STERISTRIPS 0.5 X 4"